# Patient Record
Sex: MALE | Race: WHITE | Employment: UNEMPLOYED | URBAN - METROPOLITAN AREA
[De-identification: names, ages, dates, MRNs, and addresses within clinical notes are randomized per-mention and may not be internally consistent; named-entity substitution may affect disease eponyms.]

---

## 2017-02-20 ENCOUNTER — HOSPITAL ENCOUNTER (OUTPATIENT)
Dept: GENERAL RADIOLOGY | Age: 47
Discharge: HOME | End: 2017-02-20
Payer: MEDICARE

## 2017-02-20 DIAGNOSIS — M25.572 LEFT ANKLE PAIN: ICD-10-CM

## 2017-02-20 PROCEDURE — 73630 X-RAY EXAM OF FOOT: CPT

## 2017-03-20 ENCOUNTER — HOSPITAL ENCOUNTER (OUTPATIENT)
Dept: GENERAL RADIOLOGY | Age: 47
Discharge: HOME | End: 2017-03-20
Attending: ORTHOPAEDIC SURGERY
Payer: MEDICARE

## 2017-03-20 DIAGNOSIS — M25.572 LEFT ANKLE PAIN: ICD-10-CM

## 2017-03-20 PROCEDURE — 73630 X-RAY EXAM OF FOOT: CPT

## 2017-05-12 ENCOUNTER — HOSPITAL ENCOUNTER (OUTPATIENT)
Dept: PREADMISSION TESTING | Age: 47
Discharge: HOME | End: 2017-05-12
Payer: MEDICARE

## 2017-05-12 VITALS — WEIGHT: 245 LBS | BODY MASS INDEX: 33.18 KG/M2 | HEIGHT: 72 IN

## 2017-05-12 LAB
ANION GAP BLD CALC-SCNC: 10 MMOL/L (ref 3–18)
ATRIAL RATE: 62 BPM
BACTERIA SPEC CULT: NORMAL
BUN SERPL-MCNC: 7 MG/DL (ref 7–18)
BUN/CREAT SERPL: 7 (ref 12–20)
CALCIUM SERPL-MCNC: 9 MG/DL (ref 8.5–10.1)
CALCULATED P AXIS, ECG09: 37 DEGREES
CALCULATED R AXIS, ECG10: 23 DEGREES
CALCULATED T AXIS, ECG11: 42 DEGREES
CHLORIDE SERPL-SCNC: 106 MMOL/L (ref 100–108)
CO2 SERPL-SCNC: 29 MMOL/L (ref 21–32)
CREAT SERPL-MCNC: 0.95 MG/DL (ref 0.6–1.3)
DIAGNOSIS, 93000: NORMAL
ERYTHROCYTE [DISTWIDTH] IN BLOOD BY AUTOMATED COUNT: 13.1 % (ref 11.6–14.5)
GLUCOSE SERPL-MCNC: 104 MG/DL (ref 74–99)
HCT VFR BLD AUTO: 40.9 % (ref 36–48)
HGB BLD-MCNC: 14.1 G/DL (ref 13–16)
MCH RBC QN AUTO: 31.3 PG (ref 24–34)
MCHC RBC AUTO-ENTMCNC: 34.5 G/DL (ref 31–37)
MCV RBC AUTO: 90.7 FL (ref 74–97)
P-R INTERVAL, ECG05: 162 MS
PLATELET # BLD AUTO: 173 K/UL (ref 135–420)
PMV BLD AUTO: 10.3 FL (ref 9.2–11.8)
POTASSIUM SERPL-SCNC: 4 MMOL/L (ref 3.5–5.5)
Q-T INTERVAL, ECG07: 426 MS
QRS DURATION, ECG06: 94 MS
QTC CALCULATION (BEZET), ECG08: 432 MS
RBC # BLD AUTO: 4.51 M/UL (ref 4.7–5.5)
SERVICE CMNT-IMP: NORMAL
SODIUM SERPL-SCNC: 145 MMOL/L (ref 136–145)
VENTRICULAR RATE, ECG03: 62 BPM
WBC # BLD AUTO: 4.6 K/UL (ref 4.6–13.2)

## 2017-05-12 PROCEDURE — 87641 MR-STAPH DNA AMP PROBE: CPT | Performed by: ORTHOPAEDIC SURGERY

## 2017-05-12 PROCEDURE — 85027 COMPLETE CBC AUTOMATED: CPT | Performed by: ORTHOPAEDIC SURGERY

## 2017-05-12 PROCEDURE — 80048 BASIC METABOLIC PNL TOTAL CA: CPT | Performed by: ORTHOPAEDIC SURGERY

## 2017-05-12 PROCEDURE — 36415 COLL VENOUS BLD VENIPUNCTURE: CPT | Performed by: ORTHOPAEDIC SURGERY

## 2017-05-12 PROCEDURE — 93005 ELECTROCARDIOGRAM TRACING: CPT

## 2017-05-12 RX ORDER — GABAPENTIN 100 MG/1
600 CAPSULE ORAL 3 TIMES DAILY
COMMUNITY
End: 2022-07-10 | Stop reason: SDUPTHER

## 2017-05-12 RX ORDER — CLONAZEPAM 1 MG/1
1 TABLET ORAL
COMMUNITY

## 2017-05-12 RX ORDER — TADALAFIL 5 MG/1
5 TABLET ORAL DAILY
COMMUNITY

## 2017-05-12 RX ORDER — CLONAZEPAM 2 MG/1
2 TABLET ORAL
COMMUNITY

## 2017-05-12 NOTE — PERIOP NOTES
Pt. Denies personal or family history of problems with anesthesia. Pt is independent. Denies sleep apnea. No CPAP. PCP notes in Care Everywhere. Spec Pop done and note placed in anesthesia documentation that pt drinks 42 alcoholic drinks per week.

## 2017-05-13 ENCOUNTER — ANESTHESIA EVENT (OUTPATIENT)
Dept: SURGERY | Age: 47
End: 2017-05-13
Payer: MEDICARE

## 2017-05-16 PROBLEM — M48.061 LUMBAR FORAMINAL STENOSIS: Status: ACTIVE | Noted: 2017-05-16

## 2017-05-16 PROBLEM — M51.36 DDD (DEGENERATIVE DISC DISEASE), LUMBAR: Status: ACTIVE | Noted: 2017-05-16

## 2017-05-16 PROBLEM — M51.26 HNP (HERNIATED NUCLEUS PULPOSUS), LUMBAR: Status: ACTIVE | Noted: 2017-05-16

## 2017-05-16 RX ORDER — LEVOTHYROXINE SODIUM 75 UG/1
75 TABLET ORAL
Status: CANCELLED | OUTPATIENT
Start: 2017-05-17

## 2017-05-16 RX ORDER — TAMSULOSIN HYDROCHLORIDE 0.4 MG/1
0.4 CAPSULE ORAL DAILY
Status: CANCELLED | OUTPATIENT
Start: 2017-05-17

## 2017-05-16 RX ORDER — CLONAZEPAM 0.5 MG/1
1 TABLET ORAL
Status: CANCELLED | OUTPATIENT
Start: 2017-05-16

## 2017-05-16 RX ORDER — CLONAZEPAM 0.5 MG/1
2 TABLET ORAL
Status: CANCELLED | OUTPATIENT
Start: 2017-05-16

## 2017-05-16 RX ORDER — PREGABALIN 75 MG/1
300 CAPSULE ORAL 2 TIMES DAILY
Status: CANCELLED | OUTPATIENT
Start: 2017-05-16

## 2017-05-16 RX ORDER — DEXTROAMPHETAMINE SACCHARATE, AMPHETAMINE ASPARTATE, DEXTROAMPHETAMINE SULFATE AND AMPHETAMINE SULFATE 7.5; 7.5; 7.5; 7.5 MG/1; MG/1; MG/1; MG/1
30 TABLET ORAL 2 TIMES DAILY
Status: CANCELLED | OUTPATIENT
Start: 2017-05-16

## 2017-05-16 RX ORDER — GABAPENTIN 100 MG/1
100 CAPSULE ORAL
Status: CANCELLED | OUTPATIENT
Start: 2017-05-16

## 2017-05-16 RX ORDER — ESOMEPRAZOLE MAGNESIUM 40 MG/1
40 CAPSULE, DELAYED RELEASE ORAL DAILY
Status: CANCELLED | OUTPATIENT
Start: 2017-05-17

## 2017-05-16 RX ORDER — GUANFACINE HYDROCHLORIDE 1 MG/1
5 TABLET ORAL
Status: CANCELLED | OUTPATIENT
Start: 2017-05-16

## 2017-05-22 ENCOUNTER — ANESTHESIA (OUTPATIENT)
Dept: SURGERY | Age: 47
End: 2017-05-22
Payer: MEDICARE

## 2017-05-22 ENCOUNTER — HOSPITAL ENCOUNTER (OUTPATIENT)
Age: 47
Setting detail: OUTPATIENT SURGERY
Discharge: HOME | End: 2017-05-22
Attending: ORTHOPAEDIC SURGERY | Admitting: ORTHOPAEDIC SURGERY
Payer: MEDICARE

## 2017-05-22 ENCOUNTER — APPOINTMENT (OUTPATIENT)
Dept: GENERAL RADIOLOGY | Age: 47
End: 2017-05-22
Attending: ORTHOPAEDIC SURGERY
Payer: MEDICARE

## 2017-05-22 VITALS
BODY MASS INDEX: 32.38 KG/M2 | HEART RATE: 64 BPM | TEMPERATURE: 97.5 F | HEIGHT: 72 IN | SYSTOLIC BLOOD PRESSURE: 126 MMHG | OXYGEN SATURATION: 100 % | WEIGHT: 239.06 LBS | DIASTOLIC BLOOD PRESSURE: 74 MMHG | RESPIRATION RATE: 18 BRPM

## 2017-05-22 PROBLEM — M48.061 LUMBAR FORAMINAL STENOSIS: Status: RESOLVED | Noted: 2017-05-16 | Resolved: 2017-05-22

## 2017-05-22 PROBLEM — M51.26 HNP (HERNIATED NUCLEUS PULPOSUS), LUMBAR: Status: RESOLVED | Noted: 2017-05-16 | Resolved: 2017-05-22

## 2017-05-22 PROCEDURE — 77030020782 HC GWN BAIR PAWS FLX 3M -B: Performed by: ORTHOPAEDIC SURGERY

## 2017-05-22 PROCEDURE — 74011250636 HC RX REV CODE- 250/636: Performed by: ORTHOPAEDIC SURGERY

## 2017-05-22 PROCEDURE — 76210000006 HC OR PH I REC 0.5 TO 1 HR: Performed by: ORTHOPAEDIC SURGERY

## 2017-05-22 PROCEDURE — 74011000250 HC RX REV CODE- 250: Performed by: ORTHOPAEDIC SURGERY

## 2017-05-22 PROCEDURE — 74011250636 HC RX REV CODE- 250/636: Performed by: ANESTHESIOLOGY

## 2017-05-22 PROCEDURE — 74011000258 HC RX REV CODE- 258: Performed by: ORTHOPAEDIC SURGERY

## 2017-05-22 PROCEDURE — 77030031139 HC SUT VCRL2 J&J -A: Performed by: ORTHOPAEDIC SURGERY

## 2017-05-22 PROCEDURE — 74011000250 HC RX REV CODE- 250

## 2017-05-22 PROCEDURE — 77030003666 HC NDL SPINAL BD -A: Performed by: ORTHOPAEDIC SURGERY

## 2017-05-22 PROCEDURE — 74011000272 HC RX REV CODE- 272: Performed by: ORTHOPAEDIC SURGERY

## 2017-05-22 PROCEDURE — 77030019908 HC STETH ESOPH SIMS -A: Performed by: ANESTHESIOLOGY

## 2017-05-22 PROCEDURE — 77030027521 HC SEAL BPLR AQMNTYS EVS MEDT -F: Performed by: ORTHOPAEDIC SURGERY

## 2017-05-22 PROCEDURE — 77030008462 HC STPLR SKN PROX J&J -A: Performed by: ORTHOPAEDIC SURGERY

## 2017-05-22 PROCEDURE — 77030018836 HC SOL IRR NACL ICUM -A: Performed by: ORTHOPAEDIC SURGERY

## 2017-05-22 PROCEDURE — 77030008477 HC STYL SATN SLP COVD -A: Performed by: ANESTHESIOLOGY

## 2017-05-22 PROCEDURE — 74011250637 HC RX REV CODE- 250/637: Performed by: ANESTHESIOLOGY

## 2017-05-22 PROCEDURE — 74011250636 HC RX REV CODE- 250/636

## 2017-05-22 PROCEDURE — 77030014650 HC SEAL MTRX FLOSEL BAXT -C: Performed by: ORTHOPAEDIC SURGERY

## 2017-05-22 PROCEDURE — 76010000153 HC OR TIME 1.5 TO 2 HR: Performed by: ORTHOPAEDIC SURGERY

## 2017-05-22 PROCEDURE — 77030013079 HC BLNKT BAIR HGGR 3M -A: Performed by: ANESTHESIOLOGY

## 2017-05-22 PROCEDURE — 77030003029 HC SUT VCRL J&J -B: Performed by: ORTHOPAEDIC SURGERY

## 2017-05-22 PROCEDURE — 76210000021 HC REC RM PH II 0.5 TO 1 HR: Performed by: ORTHOPAEDIC SURGERY

## 2017-05-22 PROCEDURE — 77030032490 HC SLV COMPR SCD KNE COVD -B: Performed by: ORTHOPAEDIC SURGERY

## 2017-05-22 PROCEDURE — 77030010507 HC ADH SKN DERMBND J&J -B: Performed by: ORTHOPAEDIC SURGERY

## 2017-05-22 PROCEDURE — 76060000034 HC ANESTHESIA 1.5 TO 2 HR: Performed by: ORTHOPAEDIC SURGERY

## 2017-05-22 PROCEDURE — 77030002986 HC SUT PROL J&J -A: Performed by: ORTHOPAEDIC SURGERY

## 2017-05-22 PROCEDURE — 77030011640 HC PAD GRND REM COVD -A: Performed by: ORTHOPAEDIC SURGERY

## 2017-05-22 PROCEDURE — 77030020262 HC SOL INJ SOD CL 0.9% 100ML: Performed by: ORTHOPAEDIC SURGERY

## 2017-05-22 PROCEDURE — 77030008683 HC TU ET CUF COVD -A: Performed by: ANESTHESIOLOGY

## 2017-05-22 PROCEDURE — 77030006643: Performed by: ANESTHESIOLOGY

## 2017-05-22 RX ORDER — FLUMAZENIL 0.1 MG/ML
0.2 INJECTION INTRAVENOUS
Status: DISCONTINUED | OUTPATIENT
Start: 2017-05-22 | End: 2017-05-22 | Stop reason: HOSPADM

## 2017-05-22 RX ORDER — NEOSTIGMINE METHYLSULFATE 5 MG/5 ML
SYRINGE (ML) INTRAVENOUS AS NEEDED
Status: DISCONTINUED | OUTPATIENT
Start: 2017-05-22 | End: 2017-05-22 | Stop reason: HOSPADM

## 2017-05-22 RX ORDER — KETOROLAC TROMETHAMINE 30 MG/ML
INJECTION, SOLUTION INTRAMUSCULAR; INTRAVENOUS AS NEEDED
Status: DISCONTINUED | OUTPATIENT
Start: 2017-05-22 | End: 2017-05-22 | Stop reason: HOSPADM

## 2017-05-22 RX ORDER — PROPOFOL 10 MG/ML
INJECTION, EMULSION INTRAVENOUS AS NEEDED
Status: DISCONTINUED | OUTPATIENT
Start: 2017-05-22 | End: 2017-05-22 | Stop reason: HOSPADM

## 2017-05-22 RX ORDER — SODIUM CHLORIDE, SODIUM LACTATE, POTASSIUM CHLORIDE, CALCIUM CHLORIDE 600; 310; 30; 20 MG/100ML; MG/100ML; MG/100ML; MG/100ML
125 INJECTION, SOLUTION INTRAVENOUS CONTINUOUS
Status: DISCONTINUED | OUTPATIENT
Start: 2017-05-22 | End: 2017-05-22 | Stop reason: HOSPADM

## 2017-05-22 RX ORDER — LIDOCAINE HYDROCHLORIDE 20 MG/ML
INJECTION, SOLUTION EPIDURAL; INFILTRATION; INTRACAUDAL; PERINEURAL AS NEEDED
Status: DISCONTINUED | OUTPATIENT
Start: 2017-05-22 | End: 2017-05-22 | Stop reason: HOSPADM

## 2017-05-22 RX ORDER — METOCLOPRAMIDE HYDROCHLORIDE 5 MG/ML
INJECTION INTRAMUSCULAR; INTRAVENOUS AS NEEDED
Status: DISCONTINUED | OUTPATIENT
Start: 2017-05-22 | End: 2017-05-22 | Stop reason: HOSPADM

## 2017-05-22 RX ORDER — ROCURONIUM BROMIDE 10 MG/ML
INJECTION, SOLUTION INTRAVENOUS AS NEEDED
Status: DISCONTINUED | OUTPATIENT
Start: 2017-05-22 | End: 2017-05-22 | Stop reason: HOSPADM

## 2017-05-22 RX ORDER — MIDAZOLAM HYDROCHLORIDE 1 MG/ML
INJECTION, SOLUTION INTRAMUSCULAR; INTRAVENOUS AS NEEDED
Status: DISCONTINUED | OUTPATIENT
Start: 2017-05-22 | End: 2017-05-22 | Stop reason: HOSPADM

## 2017-05-22 RX ORDER — ACETAMINOPHEN 10 MG/ML
INJECTION, SOLUTION INTRAVENOUS AS NEEDED
Status: DISCONTINUED | OUTPATIENT
Start: 2017-05-22 | End: 2017-05-22 | Stop reason: HOSPADM

## 2017-05-22 RX ORDER — FENTANYL CITRATE 50 UG/ML
INJECTION, SOLUTION INTRAMUSCULAR; INTRAVENOUS AS NEEDED
Status: DISCONTINUED | OUTPATIENT
Start: 2017-05-22 | End: 2017-05-22 | Stop reason: HOSPADM

## 2017-05-22 RX ORDER — GLYCOPYRROLATE 0.2 MG/ML
INJECTION INTRAMUSCULAR; INTRAVENOUS AS NEEDED
Status: DISCONTINUED | OUTPATIENT
Start: 2017-05-22 | End: 2017-05-22 | Stop reason: HOSPADM

## 2017-05-22 RX ORDER — SODIUM CHLORIDE 0.9 % (FLUSH) 0.9 %
5-10 SYRINGE (ML) INJECTION AS NEEDED
Status: DISCONTINUED | OUTPATIENT
Start: 2017-05-22 | End: 2017-05-22 | Stop reason: HOSPADM

## 2017-05-22 RX ORDER — INSULIN LISPRO 100 [IU]/ML
INJECTION, SOLUTION INTRAVENOUS; SUBCUTANEOUS ONCE
Status: DISCONTINUED | OUTPATIENT
Start: 2017-05-22 | End: 2017-05-22 | Stop reason: HOSPADM

## 2017-05-22 RX ORDER — DEXTROSE 50 % IN WATER (D50W) INTRAVENOUS SYRINGE
25-50 AS NEEDED
Status: DISCONTINUED | OUTPATIENT
Start: 2017-05-22 | End: 2017-05-22 | Stop reason: HOSPADM

## 2017-05-22 RX ORDER — TIZANIDINE 4 MG/1
4 TABLET ORAL
Qty: 60 TAB | Refills: 0 | Status: SHIPPED | OUTPATIENT
Start: 2017-05-22

## 2017-05-22 RX ORDER — OXYCODONE HYDROCHLORIDE 5 MG/1
5 TABLET ORAL ONCE
Status: COMPLETED | OUTPATIENT
Start: 2017-05-22 | End: 2017-05-22

## 2017-05-22 RX ORDER — MAGNESIUM SULFATE 100 %
4 CRYSTALS MISCELLANEOUS AS NEEDED
Status: DISCONTINUED | OUTPATIENT
Start: 2017-05-22 | End: 2017-05-22 | Stop reason: HOSPADM

## 2017-05-22 RX ORDER — FENTANYL CITRATE 50 UG/ML
50 INJECTION, SOLUTION INTRAMUSCULAR; INTRAVENOUS
Status: COMPLETED | OUTPATIENT
Start: 2017-05-22 | End: 2017-05-22

## 2017-05-22 RX ORDER — OXYCODONE HYDROCHLORIDE 10 MG/1
10-15 TABLET ORAL
Qty: 90 TAB | Refills: 0 | Status: ON HOLD | OUTPATIENT
Start: 2017-05-22 | End: 2017-06-15

## 2017-05-22 RX ORDER — NALOXONE HYDROCHLORIDE 0.4 MG/ML
0.1 INJECTION, SOLUTION INTRAMUSCULAR; INTRAVENOUS; SUBCUTANEOUS AS NEEDED
Status: DISCONTINUED | OUTPATIENT
Start: 2017-05-22 | End: 2017-05-22 | Stop reason: HOSPADM

## 2017-05-22 RX ORDER — SODIUM CHLORIDE, SODIUM LACTATE, POTASSIUM CHLORIDE, CALCIUM CHLORIDE 600; 310; 30; 20 MG/100ML; MG/100ML; MG/100ML; MG/100ML
1000 INJECTION, SOLUTION INTRAVENOUS CONTINUOUS
Status: DISCONTINUED | OUTPATIENT
Start: 2017-05-22 | End: 2017-05-22 | Stop reason: HOSPADM

## 2017-05-22 RX ORDER — ONDANSETRON 2 MG/ML
INJECTION INTRAMUSCULAR; INTRAVENOUS AS NEEDED
Status: DISCONTINUED | OUTPATIENT
Start: 2017-05-22 | End: 2017-05-22 | Stop reason: HOSPADM

## 2017-05-22 RX ADMIN — Medication 3 MG: at 09:39

## 2017-05-22 RX ADMIN — SODIUM CHLORIDE, SODIUM LACTATE, POTASSIUM CHLORIDE, AND CALCIUM CHLORIDE 125 ML/HR: 600; 310; 30; 20 INJECTION, SOLUTION INTRAVENOUS at 06:46

## 2017-05-22 RX ADMIN — KETOROLAC TROMETHAMINE 30 MG: 30 INJECTION, SOLUTION INTRAMUSCULAR; INTRAVENOUS at 09:31

## 2017-05-22 RX ADMIN — FENTANYL CITRATE 25 MCG: 50 INJECTION, SOLUTION INTRAMUSCULAR; INTRAVENOUS at 09:36

## 2017-05-22 RX ADMIN — KETOROLAC TROMETHAMINE 30 MG: 30 INJECTION, SOLUTION INTRAMUSCULAR; INTRAVENOUS at 09:34

## 2017-05-22 RX ADMIN — SODIUM CHLORIDE 900 MG: 900 INJECTION, SOLUTION INTRAVENOUS at 08:14

## 2017-05-22 RX ADMIN — FENTANYL CITRATE 50 MCG: 50 INJECTION, SOLUTION INTRAMUSCULAR; INTRAVENOUS at 07:21

## 2017-05-22 RX ADMIN — ONDANSETRON 4 MG: 2 INJECTION INTRAMUSCULAR; INTRAVENOUS at 08:14

## 2017-05-22 RX ADMIN — SODIUM CHLORIDE, SODIUM LACTATE, POTASSIUM CHLORIDE, AND CALCIUM CHLORIDE: 600; 310; 30; 20 INJECTION, SOLUTION INTRAVENOUS at 08:14

## 2017-05-22 RX ADMIN — ACETAMINOPHEN 1000 MG: 10 INJECTION, SOLUTION INTRAVENOUS at 09:27

## 2017-05-22 RX ADMIN — GLYCOPYRROLATE 0.4 MG: 0.2 INJECTION INTRAMUSCULAR; INTRAVENOUS at 09:39

## 2017-05-22 RX ADMIN — FENTANYL CITRATE 50 MCG: 50 INJECTION, SOLUTION INTRAMUSCULAR; INTRAVENOUS at 10:13

## 2017-05-22 RX ADMIN — FENTANYL CITRATE 50 MCG: 50 INJECTION, SOLUTION INTRAMUSCULAR; INTRAVENOUS at 10:18

## 2017-05-22 RX ADMIN — LIDOCAINE HYDROCHLORIDE 60 MG: 20 INJECTION, SOLUTION EPIDURAL; INFILTRATION; INTRACAUDAL; PERINEURAL at 08:20

## 2017-05-22 RX ADMIN — MIDAZOLAM HYDROCHLORIDE 3 MG: 1 INJECTION, SOLUTION INTRAMUSCULAR; INTRAVENOUS at 08:14

## 2017-05-22 RX ADMIN — FENTANYL CITRATE 25 MCG: 50 INJECTION, SOLUTION INTRAMUSCULAR; INTRAVENOUS at 09:33

## 2017-05-22 RX ADMIN — METOCLOPRAMIDE HYDROCHLORIDE 10 MG: 5 INJECTION INTRAMUSCULAR; INTRAVENOUS at 08:14

## 2017-05-22 RX ADMIN — SODIUM CHLORIDE, SODIUM LACTATE, POTASSIUM CHLORIDE, AND CALCIUM CHLORIDE 125 ML/HR: 600; 310; 30; 20 INJECTION, SOLUTION INTRAVENOUS at 10:03

## 2017-05-22 RX ADMIN — ROCURONIUM BROMIDE 60 MG: 10 INJECTION, SOLUTION INTRAVENOUS at 08:20

## 2017-05-22 RX ADMIN — FENTANYL CITRATE 100 MCG: 50 INJECTION, SOLUTION INTRAMUSCULAR; INTRAVENOUS at 08:20

## 2017-05-22 RX ADMIN — FENTANYL CITRATE 50 MCG: 50 INJECTION, SOLUTION INTRAMUSCULAR; INTRAVENOUS at 10:01

## 2017-05-22 RX ADMIN — MIDAZOLAM HYDROCHLORIDE 2 MG: 1 INJECTION, SOLUTION INTRAMUSCULAR; INTRAVENOUS at 08:17

## 2017-05-22 RX ADMIN — FENTANYL CITRATE 50 MCG: 50 INJECTION, SOLUTION INTRAMUSCULAR; INTRAVENOUS at 09:51

## 2017-05-22 RX ADMIN — FENTANYL CITRATE 50 MCG: 50 INJECTION, SOLUTION INTRAMUSCULAR; INTRAVENOUS at 10:06

## 2017-05-22 RX ADMIN — PROPOFOL 180 MG: 10 INJECTION, EMULSION INTRAVENOUS at 08:20

## 2017-05-22 RX ADMIN — OXYCODONE HYDROCHLORIDE 5 MG: 5 TABLET ORAL at 10:59

## 2017-05-22 NOTE — ANESTHESIA PREPROCEDURE EVALUATION
Anesthetic History   No history of anesthetic complications            Review of Systems / Medical History  Patient summary reviewed, nursing notes reviewed and pertinent labs reviewed    Pulmonary  Within defined limits            Pertinent negatives: No sleep apnea     Neuro/Psych     seizures: well controlled         Cardiovascular  Within defined limits  Hypertension: well controlled              Exercise tolerance: >4 METS     GI/Hepatic/Renal     GERD: well controlled           Endo/Other        Morbid obesity and arthritis  Pertinent negatives: No hypothyroidism   Other Findings              Physical Exam    Airway  Mallampati: II  TM Distance: 4 - 6 cm  Neck ROM: normal range of motion   Mouth opening: Normal     Cardiovascular    Rhythm: regular  Rate: normal         Dental    Dentition: Bridges  Comments: Left lower   Pulmonary  Breath sounds clear to auscultation               Abdominal  GI exam deferred       Other Findings            Anesthetic Plan    ASA: 3  Anesthesia type: general          Induction: Intravenous  Anesthetic plan and risks discussed with: Patient

## 2017-05-22 NOTE — PROGRESS NOTES
conducted a pre-surgery visit with Michael Velásquez, who is a 40 y.o.,male. The  provided the following Interventions:  Initiated a relationship of care and support. Offered prayer and assurance of continued prayers on patient's behalf. Plan:  Chaplains will continue to follow and will provide pastoral care on an as needed/requested basis.  recommends bedside caregivers page  on duty if patient shows signs of acute spiritual or emotional distress.     [address] [facility],[address], [address] [facility] office  [phone] pager

## 2017-05-22 NOTE — IP AVS SNAPSHOT
Aimee Foil 
 
 
 [address] [facility] [address] 
[phone] Patient: Maddison Miller MRN: [id number] CBE:[id number] You are allergic to the following Allergen Reactions Prozac (Fluoxetine) Unknown (comments) Hives or jittery-pt can't remember Augmentin (Amoxicillin-Pot Clavulanate) Rash Geodon (Ziprasidone Hcl) Other (comments) diskensia reported by pt Mastisol Adhesive (Gum Kwoudg-Vnqlxk-Ubdm-Alcohol) Rash Morphine Itching Morphine -IR, not ER Quetiapine Palpitations Trazodone Hives Vicodin (Hydrocodone-Acetaminophen) Hives Recent Documentation Height Weight BMI Smoking Status 1.829 m 108.4 kg 32.42 kg/m2 Former Smoker Emergency Contacts Name Discharge Info Relation Home Work Mobile Birgit An CAREGIVER [3] Friend [5] [phone] About your hospitalization You were admitted on:  May 22, 2017 [facility] You were discharged on:  May 22, 2017 Unit phone number:  [phone] Why you were hospitalized Your primary diagnosis was:  Lumbar Foraminal Stenosis Your diagnoses also included:  Ddd (Degenerative Disc Disease), Lumbar, Hnp (Herniated Nucleus Pulposus), Lumbar Providers Seen During Your Hospitalizations Provider Role Specialty Primary office phone Sinai Stanford MD Attending Provider Orthopedic Surgery [phone] Your Primary Care Physician (PCP) Primary Care Physician Office Phone Office Fax Zunilda Conde [phone] Follow-up Information Follow up With Details Comments Contact Info Shirlene Olson MD   [facility] [address] [facility] [address] [place] [address] [phone] Sinai Stanford MD Follow up in 10 day(s)  [address] [facility] and [address] [place] [address] [address] [address] 
[phone] Current Discharge Medication List  
  
START taking these medications Dose & Instructions Dispensing Information Comments Morning Noon Evening Bedtime  
 tiZANidine 4 mg tablet Commonly known as:  Elena Valencia Your last dose was: Your next dose is:    
   
   
 Dose:  4 mg Take 1 Tab by mouth three (3) times daily as needed. Indications: MUSCLE SPASM Quantity:  60 Tab Refills:  0 CONTINUE these medications which have CHANGED Dose & Instructions Dispensing Information Comments Morning Noon Evening Bedtime  
 oxyCODONE IR 10 mg Tab immediate release tablet Commonly known as:  Marii Ceballos What changed:  how much to take Your last dose was: Your next dose is:    
   
   
 Dose:  10-15 mg Take 1-1.5 Tabs by mouth every four (4) hours as needed. Max Daily Amount: 90 mg. Quantity:  90 Tab Refills:  0 CONTINUE these medications which have NOT CHANGED Dose & Instructions Dispensing Information Comments Morning Noon Evening Bedtime CIALIS 5 mg tablet Generic drug:  tadalafil Your last dose was: Your next dose is:    
   
   
 Dose:  5 mg Take 5 mg by mouth daily. Indications: Erectile Dysfunction Refills:  0  
     
   
   
   
  
 dextroamphetamine-amphetamine 30 mg tablet Commonly known as:  ADDERALL Your last dose was: Your next dose is:    
   
   
 Dose:  30 mg Take 30 mg by mouth two (2) times a day. Indications: ATTENTION-DEFICIT HYPERACTIVITY DISORDER Refills:  0  
     
   
   
   
  
 esomeprazole 40 mg capsule Commonly known as:  Kely Raid Your last dose was: Your next dose is:    
   
   
 Dose:  40 mg Take 40 mg by mouth daily. Indications: GASTROESOPHAGEAL REFLUX Refills:  0  
     
   
   
   
  
 gabapentin 100 mg capsule Commonly known as:  NEURONTIN  
   
 Your last dose was: Your next dose is:    
   
   
 Dose:  100 mg Take 100 mg by mouth four (4) times daily as needed. Indications: NEUROPATHIC PAIN Refills:  0  
     
   
   
   
  
 * KlonoPIN 2 mg tablet Generic drug:  clonazePAM  
   
Your last dose was: Your next dose is:    
   
   
 Dose:  2 mg Take 2 mg by mouth nightly. Indications: sleep Refills:  0  
     
   
   
   
  
 * KlonoPIN 1 mg tablet Generic drug:  clonazePAM  
   
Your last dose was: Your next dose is:    
   
   
 Dose:  1 mg Take 1 mg by mouth three (3) times daily as needed. Indications: anxiety Refills:  0  
     
   
   
   
  
 levothyroxine 75 mcg tablet Commonly known as:  SYNTHROID Your last dose was: Your next dose is:    
   
   
 Dose:  75 mcg Take 75 mcg by mouth Daily (before breakfast). Indications: hypothyroidism Refills:  0  
     
   
   
   
  
 pregabalin 300 mg capsule Commonly known as:  Benson Susu Your last dose was: Your next dose is:    
   
   
 Dose:  300 mg Take 300 mg by mouth two (2) times a day. Indications: GENERALIZED ANXIETY DISORDER, neuropathy Refills:  0  
     
   
   
   
  
 tamsulosin 0.4 mg capsule Commonly known as:  FLOMAX Your last dose was: Your next dose is:    
   
   
 Dose:  0.4 mg Take 0.4 mg by mouth daily. Indications: neurogenic bladder Refills:  0  
     
   
   
   
  
 TENEX 2 mg IR tablet Generic drug:  guanFACINE IR Your last dose was: Your next dose is:    
   
   
 Dose:  5 mg Take 5 mg by mouth nightly as needed. Indications: ptsd Refills:  0  
     
   
   
   
  
 vortioxetine 20 mg tablet Commonly known as:  TRINTELLIX Your last dose was: Your next dose is:    
   
   
 Dose:  20 mg Take 20 mg by mouth daily. Indications: major depressive disorder, ptsd Refills:  0 * Notice: This list has 2 medication(s) that are the same as other medications prescribed for you. Read the directions carefully, and ask your doctor or other care provider to review them with you. Where to Get Your Medications Information on where to get these meds will be given to you by the nurse or doctor. ! Ask your nurse or doctor about these medications  
  oxyCODONE IR 10 mg Tab immediate release tablet  
 tiZANidine 4 mg tablet Discharge Instructions WBAT. Change dressing in 3 days. Do not get incision wet x 5 days. No lifting over 20 pounds. Wear lumbar brace when out of bed, until follow up appointment. Take stool softeners daily while taking pain medications, since pain medicines are constipating. DISCHARGE SUMMARY from Nurse The following personal items are in your possession at time of discharge: 
 
Dental Appliances: Other (comment) (Dental bridge) Visual Aid: Glasses, At home Home Medications: None Jewelry: None Clothing: Pants, Undergarments, Footwear, Shirt Yariel Board) Other Valuables: Cell Phone, Walk[employer] [address]) PATIENT INSTRUCTIONS: 
 
 
F-face looks uneven A-arms unable to move or move unevenly S-speech slurred or non-existent T-time-call 911 as soon as signs and symptoms begin-DO NOT go Back to bed or wait to see if you get better-TIME IS BRAIN. Warning Signs of HEART ATTACK Call 911 if you have these symptoms: 
? Chest discomfort.  Most heart attacks involve discomfort in the center of the chest that lasts more than a few minutes, or that goes away and comes back. It can feel like uncomfortable pressure, squeezing, fullness, or pain. ? Discomfort in other areas of the upper body. Symptoms can include pain or discomfort in one or both arms, the back, neck, jaw, or stomach. ? Shortness of breath with or without chest discomfort. ? Other signs may include breaking out in a cold sweat, nausea, or lightheadedness. Don't wait more than five minutes to call [address]! Fast action can save your life. Calling 911 is almost always the fastest way to get lifesaving treatment. Emergency Medical Services staff can begin treatment when they arrive  up to an hour sooner than if someone gets to the hospital by car. Patient armband removed and shredded The discharge information has been reviewed with the patient and caregiver. The patient and caregiver verbalized understanding. Discharge medications reviewed with the patient and caregiver and appropriate educational materials and side effects teaching were provided. Discharge Orders None Introducing \[facility] [place] [facility]\"" & [facility]! [facility] introduces madKast patient portal. Now you can access parts of your medical record, email your doctor's office, and request medication refills online. 1. In your internet browser, go to [email or web address] 2. Click on the First Time User? Click Here link in the Sign In box. You will see the New Member Sign Up page. 3. Enter your madKast Access Code exactly as it appears below. You will not need to use this code after youve completed the sign-up process. If you do not sign up before the expiration date, you must request a new code. · madKast Access Code: [email or web address] Expires: 8/6/2017  6:27 PM 
 
4. Enter the last four digits of your Social Security Number (xxxx) and Date of Birth (mm/dd/yyyy) as indicated and click Submit.  You will be taken to the next sign-up page. 5. Create a Herziot ID. This will be your JustFamily login ID and cannot be changed, so think of one that is secure and easy to remember. 6. Create a JustFamily password. You can change your password at any time. 7. Enter your Password Reset Question and Answer. This can be used at a later time if you forget your password. 8. Enter your e-mail address. You will receive e-mail notification when new information is available in [address] 9. Click Sign Up. You can now view and download portions of your medical record. 10. Click the Download Summary menu link to download a portable copy of your medical information. If you have questions, please visit the Frequently Asked Questions section of the JustFamily website. Remember, JustFamily is NOT to be used for urgent needs. For medical emergencies, dial 911. Now available from your iPhone and Android! General Information Please provide this summary of care documentation to your next provider. Patient Signature:  ____________________________________________________________ Date:  ____________________________________________________________  
  
Catrachita Lemons Provider Signature:  ____________________________________________________________ Date:  ____________________________________________________________

## 2017-05-22 NOTE — OP NOTES
Patient: Hayley Currie MRN: [id number]  SSN: [id number]    YOB: 1970  Age: 40 y.o. Sex: male      Date of Procedure: 5/22/2017   Preoperative Diagnosis: LUMBAR STENOSIS,LUMBAGO,FACET ARTHROSIS,LEFT SCIATICA,REFLUX,HYPOTHYROIDISM,TRAUMATIC BRAIN INJURY,POST TRAUMATIC STRESS DISORDER  Postoperative Diagnosis: LUMBAR STENOSIS,LUMBAGO,FACET ARTHROSIS,LEFT SCIATICA,REFLUX,HYPOTHYROIDISM,TRAUMATIC BRAIN INJURY,POST TRAUMATIC STRESS DISORDER    Procedure: Procedure(s):  LEFT L3-4,4-5 LAMINECTOMIES W/C-ARM **SPEC POP**  Surgeon(s) and Role:     * Magalys Wasserman MD - Primary  Assistant: Jasmina Chavis PA-C  Anesthesia: General   Estimated Blood Loss: 50cc  Specimens: * No specimens in log *   Findings: HNP   Complications: none      Indications: This is a 40y.o. year-old male who presents with significant left lower extremity pain, worsening ability to do activities of daily living. X-rays and MRI scan revealing disc herniation and foraminal stenosis, and degenerative disk disease. Having having failed conservative care now comes for operative intervention. DESCRIPTION OF PROCEDURE: After correct identification, the patient was   taken to the operating room, placed supine on the table, general   endotracheal anesthesia induced. The patient was then rotated onto the Pedro frame and prepped with DuraPrep. 2grams of Ancef was given. Midline incision was made in the lumbar spine, taken down to the spinous processes of L3-5. The posterior lamina of L3-5 were exposed on the left. Keyana retractor was then placed. The wound was then irrigated. Laminectomy was then performed of the inferior aspect of L4 as well as the superior aspect of L5. This provided excellent visualization of the dura. Foraminotomy was then performed to completely decompress the nerve root. The nerve root was then mobilized medially and held with a nerve root retractor. The disc was noted to be protruberant.  #15 blade was used to make a cruciate incision in the annulus and large pieces of disc material were removed from behind the annulus as well as from the disc space itself. Bleeding controlled with bipolar electrocautery. Laminectomy was then performed of the inferior aspect of L3 as well as the superior aspect of L4. This provided excellent visualization of the dura. Foraminotomy was then performed to completely decompress the nerve root. The nerve root was then mobilized medially and held with a nerve root retractor. The disc was noted to have extruded material. #15 blade was used to make a cruciate incision in the annulus and large pieces of disc material were removed from behind the annulus as well as from the disc space itself. Bleeding controlled with bipolar electrocautery. The wound was then irrigated. Fascia was then closed using #1 Vicryl suture. Subcutaneous tissue   approximated with 2-0 Vicryl suture. Skin approximated with 3-0 PDS suture and dermabond was appled. Sterile dressing was applied. The patient tolerated the procedure well and taken to Recovery room in good   condition.

## 2017-05-22 NOTE — PERIOP NOTES
Patient arrived to PACU at 7757  Report received from RICKEY Conley and [facility] regarding patient . Surgeon(s):Mylene and Procedure(s): verified   Confirmed with allergies and dressings discussed. Anesthesia type, drugs, patient history, complications, estimated blood loss, vital signs, intake and output, and last pain medication, lines, reversal medications and temperature were reviewed. PACU monitoring initiated. See doc flow sheet for detailed physical assessment.

## 2017-05-22 NOTE — INTERVAL H&P NOTE
H&P Update:  Maddison Miller was seen and examined. History and physical has been reviewed. The patient has been examined.  There have been no significant clinical changes since the completion of the originally dated History and Physical.    Signed By: Sinai Stanford MD     May 22, 2017 7:31 AM

## 2017-05-22 NOTE — DISCHARGE INSTRUCTIONS
WBAT. Change dressing in 3 days. Do not get incision wet x 5 days. No lifting over 20 pounds. Wear lumbar brace when out of bed, until follow up appointment. Take stool softeners daily while taking pain medications, since pain medicines are constipating. DISCHARGE SUMMARY from Nurse    The following personal items are in your possession at time of discharge:    Dental Appliances: Other (comment) (Dental bridge)  Visual Aid: Glasses, At home     Home Medications: None  Jewelry: None  Clothing: Pants, Undergarments, Footwear, Shirt Keron Olea)  Other Valuables: Cell Phone, Wallet (Elmer)             PATIENT INSTRUCTIONS:    After general anesthesia or intravenous sedation, for 24 hours or while taking prescription Narcotics:  · Limit your activities  · Do not drive and operate hazardous machinery  · Do not make important personal or business decisions  · Do  not drink alcoholic beverages  · If you have not urinated within 8 hours after discharge, please contact your surgeon on call. Report the following to your surgeon:  · Excessive pain, swelling, redness or odor of or around the surgical area  · Temperature over 100.5  · Nausea and vomiting lasting longer than 4 hours or if unable to take medications  · Any signs of decreased circulation or nerve impairment to extremity: change in color, persistent  numbness, tingling, coldness or increase pain  · Any questions        What to do at Home:  Recommended activity: Ambulate in house, No lifting, or Strenuous exercise for until advised and No driving while on analgesics,     If you experience any of the following symptoms fever, chills, uncontrollable pauin , active bleeding or drainage, circulation changes, please follow up with Dr. Dimitris Haddad. *  Please give a list of your current medications to your Primary Care Provider.     *  Please update this list whenever your medications are discontinued, doses are      changed, or new medications (including over-the-counter products) are added. *  Please carry medication information at all times in case of emergency situations. These are general instructions for a healthy lifestyle:    No smoking/ No tobacco products/ Avoid exposure to second hand smoke    Surgeon General's Warning:  Quitting smoking now greatly reduces serious risk to your health. Obesity, smoking, and sedentary lifestyle greatly increases your risk for illness    A healthy diet, regular physical exercise & weight monitoring are important for maintaining a healthy lifestyle    You may be retaining fluid if you have a history of heart failure or if you experience any of the following symptoms:  Weight gain of 3 pounds or more overnight or 5 pounds in a week, increased swelling in our hands or feet or shortness of breath while lying flat in bed. Please call your doctor as soon as you notice any of these symptoms; do not wait until your next office visit. Recognize signs and symptoms of STROKE:    F-face looks uneven    A-arms unable to move or move unevenly    S-speech slurred or non-existent    T-time-call 911 as soon as signs and symptoms begin-DO NOT go       Back to bed or wait to see if you get better-TIME IS BRAIN. Warning Signs of HEART ATTACK     Call 911 if you have these symptoms:   Chest discomfort. Most heart attacks involve discomfort in the center of the chest that lasts more than a few minutes, or that goes away and comes back. It can feel like uncomfortable pressure, squeezing, fullness, or pain.  Discomfort in other areas of the upper body. Symptoms can include pain or discomfort in one or both arms, the back, neck, jaw, or stomach.  Shortness of breath with or without chest discomfort.  Other signs may include breaking out in a cold sweat, nausea, or lightheadedness. Don't wait more than five minutes to call 911 - MINUTES MATTER! Fast action can save your life.  Calling 911 is almost always the fastest way to get lifesaving treatment. Emergency Medical Services staff can begin treatment when they arrive -- up to an hour sooner than if someone gets to the hospital by car. Patient armband removed and shredded  The discharge information has been reviewed with the patient and caregiver. The patient and caregiver verbalized understanding. Discharge medications reviewed with the patient and caregiver and appropriate educational materials and side effects teaching were provided.

## 2017-05-22 NOTE — PERIOP NOTES
TRANSFER - OUT REPORT:    Verbal report given to [facility] RN(name) on Yolanda Oliveira  being transferred to [address] [place], [place] [address]unit) for routine post - op       Report consisted of patients Situation, Background, Assessment and   Recommendations(SBAR). Information from the following report(s) OR Summary, Intake/Output and MAR was reviewed with the receiving nurse. Lines:   Peripheral IV 05/22/17 Right Hand (Active)   Site Assessment Clean, dry, & intact 5/22/2017 10:29 AM   Phlebitis Assessment 0 5/22/2017 10:29 AM   Infiltration Assessment 0 5/22/2017 10:29 AM   Dressing Status Clean, dry, & intact 5/22/2017 10:29 AM   Dressing Type Transparent;Tape 5/22/2017 10:29 AM   Hub Color/Line Status Infusing 5/22/2017 10:29 AM        Opportunity for questions and clarification was provided.       Patient transported with:   [employer]

## 2017-05-22 NOTE — PERIOP NOTES
AVS med list reviewed and verified - duplicate for Klonopin noted - prescribed by MD - common med side effects handout to pt

## 2017-05-22 NOTE — BRIEF OP NOTE
BRIEF OPERATIVE NOTE    Date of Procedure: 5/22/2017   Preoperative Diagnosis: LUMBAR STENOSIS,LUMBAGO,FACET ARTHROSIS,LEFT SCIATICA,REFLUX,HYPOTHYROIDISM,TRAUMATIC BRAIN INJURY,POST TRAUMATIC STRESS DISORDER  Postoperative Diagnosis: LUMBAR STENOSIS,LUMBAGO,FACET ARTHROSIS,LEFT SCIATICA,REFLUX,HYPOTHYROIDISM,TRAUMATIC BRAIN INJURY,POST TRAUMATIC STRESS DISORDER    Procedure: Procedure(s):  LEFT L3-4,4-5 LAMINECTOMIES W/C-ARM **SPEC POP**  Surgeon(s) and Role:     * Franck Chahal MD - Primary  Assistant: Felisa Islas PA-C  Anesthesia: General   Estimated Blood Loss: 10cc  Specimens: * No specimens in log *   Findings: left L3-4, L4-5 spinal stenosis,HNP,facet arthrosis.   Complications: none  Implants: * No implants in log *

## 2017-05-22 NOTE — ANESTHESIA POSTPROCEDURE EVALUATION
Post-Anesthesia Evaluation & Assessment    Visit Vitals    /74    Pulse 66    Temp 36.1 °C (97 °F)    Resp 15    Ht 6' (1.829 m)    Wt 108.4 kg (239 lb 1 oz)    SpO2 100%    BMI 32.42 kg/m2       No untreated/active PONV    Post-operative hydration adequate. Adequate post-operative analgesia per PACU discharge criteria    Mental status & level of consciousness: alert and oriented x 3    Respiratory status: patent unassisted airway     No apparent anesthetic complications requiring additional post-anesthetic care    Patient has met all discharge requirements.             Elizabeth Tapia MD

## 2017-06-13 ENCOUNTER — ANESTHESIA EVENT (OUTPATIENT)
Dept: SURGERY | Age: 47
DRG: 858 | End: 2017-06-13
Payer: MEDICARE

## 2017-06-13 PROBLEM — T81.89XA LUMBAR SURGICAL WOUND FLUID COLLECTION: Status: ACTIVE | Noted: 2017-06-13

## 2017-06-13 PROBLEM — A49.02 MRSA (METHICILLIN RESISTANT STAPHYLOCOCCUS AUREUS) INFECTION: Status: ACTIVE | Noted: 2017-06-13

## 2017-06-13 NOTE — H&P
Patient Name:  Juliet Meyer  YOB: 1970      Chief Complaint:  Status post L3 to L5 laminectomies done 05/22/17. History of Chief Complaint:  Mr. Mili Francisco is a 40 y.o male  s/p L3 to L5 laminectomies done 05/22/17 by Dr Jerod Alexis at [facility]. He says he has been overall feeling a bit worse. He has been taken his antibiotics. He has noticed some drainage coming from his wound. He has been putting peroxide on it and antibiotic cream.  Lumbar wound cultures results  taken in the office on 6/8/17 are on the paper chart & reveal MRSA.     Past Medical/Surgical History:    Disease/Disorder Type Date Side Surgery Date Side Comment   Arthritis          Osteoarthritis          Depression          Headache, migraine          PTSD          Traumatic brain injury       DL 03/23/2017 -frontal lobe   Nerve disorder          Spinal stenosis          Thyroid disease          Seizure disorder              Ankle surgery  left    IBS - Irritable bowel syndrome          Mental illness              ACL repair 1988 left        Spinal fusion, cervical 2009  DL 03/23/2017 -C5-6       Spinal fusion, thoracic 2011  DL 03/23/2017 -T10-12       Gastric sleeve 2013         Spinal fusion, cervical 2016  DL 03/23/2017 -C6-7     Allergies:    Ingredient Reaction Medication Name Comment   ZIPRASIDONE MESYLATE Tardive dyskinesia Geodon    ZIPRASIDONE HCL Tardive dyskinesia Geodon    AMOXICILLIN TRIHYDRATE Hives Augmentin    ACETAMINOPHEN Hives Vicodin    HYDROCODONE BITARTRATE Hives Vicodin    QUETIAPINE FUMARATE  Seroquel    MORPHINE      TAPE, OCCLUSIVE ADHESIVE      POTASSIUM CLAVULANATE      TRAZODONE        Current Medications:    Medication Directions   dicyclomine 10 mg capsule    Brintellix 20 mg tablet    Xanax 0.5 mg tablet    Mobic 7.5 mg tablet    Lyrica 300 mg capsule    clonazepam 2 mg tablet    Cialis 5 mg tablet take 1 tablet by oral route  every day   Synthroid 75 mcg tablet    Prilosec 20 mg capsule,delayed release take 1 capsule by oral route  every day before a meal   Flomax 0.4 mg capsule take 1 capsule by oral route  every day   gabapentin 100 mg capsule take 1 (100MG)  by oral route 3 times every day   Tenex take 5mg tablet by oral route  every day at bedtime   Percocet 5 mg-325 mg tablet take 1 tablet by oral route  every 6 hours as needed   Cipro 500 mg tablet take 1 tablet by oral route  every 12 hours     Social History:      SMOKING  Status Tobacco Type Units Per Day Yrs Used   Former smoker Cigarette       ALCOHOL  There is no current alcohol usage   Type: Beer and liquor. 6 pk of beer consumed weekly. Patient quit drinking in 7. Family History:    Disease Detail Family Member Age Cause of Death Comments   Family history of Arthritis   N    Family history of Cardiovascular disease   N    Family history of Diabetes mellitus   N    Family history of Hypertension   N    Family history of Stroke   N    Family history of Tuberculosis   N    Family history of Alcoholism       Family history of Cancer       Family history of Heart disease         Review of Systems:    Pertinent positives include chills and joint pain. Pertinent negatives include muscle weakness. Vitals:  Date BP Pulse Temp (F) Resp. (per min.) Height (Total in.) Weight (lbs.) BMI   01/07/2016     72.00  34.58     Physical Examination:    Heart- RRR  Lungs-CTA sadaf  Abdomen- +BS,soft,nontender  Skin:  His wound seems to be closing, but he has three areas which seem to be slow to heal. There is no erythema. Musculoskeletal:  There is no tenderness in the lower back. Impression: We will proceed with return to the operating room for irrigation and debridement. Wound cultures taken in the office on 6/8/17 are on the paper chart & reveal MRSA.

## 2017-06-14 ENCOUNTER — HOSPITAL ENCOUNTER (INPATIENT)
Age: 47
LOS: 1 days | Discharge: HOME HEALTH | DRG: 858 | End: 2017-06-15
Attending: ORTHOPAEDIC SURGERY | Admitting: ORTHOPAEDIC SURGERY
Payer: MEDICARE

## 2017-06-14 ENCOUNTER — ANESTHESIA (OUTPATIENT)
Dept: SURGERY | Age: 47
DRG: 858 | End: 2017-06-14
Payer: MEDICARE

## 2017-06-14 LAB
CRP SERPL-MCNC: 4 MG/DL (ref 0–0.3)
ERYTHROCYTE [DISTWIDTH] IN BLOOD BY AUTOMATED COUNT: 12.9 % (ref 11.6–14.5)
ERYTHROCYTE [SEDIMENTATION RATE] IN BLOOD: 32 MM/HR (ref 0–15)
HCT VFR BLD AUTO: 37.5 % (ref 36–48)
HGB BLD-MCNC: 12.8 G/DL (ref 13–16)
MCH RBC QN AUTO: 31.1 PG (ref 24–34)
MCHC RBC AUTO-ENTMCNC: 34.1 G/DL (ref 31–37)
MCV RBC AUTO: 91 FL (ref 74–97)
PLATELET # BLD AUTO: 324 K/UL (ref 135–420)
PMV BLD AUTO: 9.7 FL (ref 9.2–11.8)
RBC # BLD AUTO: 4.12 M/UL (ref 4.7–5.5)
WBC # BLD AUTO: 6.8 K/UL (ref 4.6–13.2)

## 2017-06-14 PROCEDURE — 76210000000 HC OR PH I REC 2 TO 2.5 HR: Performed by: ORTHOPAEDIC SURGERY

## 2017-06-14 PROCEDURE — 51798 US URINE CAPACITY MEASURE: CPT

## 2017-06-14 PROCEDURE — 74011250637 HC RX REV CODE- 250/637: Performed by: PHYSICIAN ASSISTANT

## 2017-06-14 PROCEDURE — 74011250636 HC RX REV CODE- 250/636: Performed by: ANESTHESIOLOGY

## 2017-06-14 PROCEDURE — 74011250636 HC RX REV CODE- 250/636: Performed by: ORTHOPAEDIC SURGERY

## 2017-06-14 PROCEDURE — 74011250636 HC RX REV CODE- 250/636

## 2017-06-14 PROCEDURE — 77030002916 HC SUT ETHLN J&J -A: Performed by: ORTHOPAEDIC SURGERY

## 2017-06-14 PROCEDURE — 36415 COLL VENOUS BLD VENIPUNCTURE: CPT | Performed by: ORTHOPAEDIC SURGERY

## 2017-06-14 PROCEDURE — 77030008683 HC TU ET CUF COVD -A: Performed by: ANESTHESIOLOGY

## 2017-06-14 PROCEDURE — 77030018836 HC SOL IRR NACL ICUM -A: Performed by: ORTHOPAEDIC SURGERY

## 2017-06-14 PROCEDURE — 77030006643: Performed by: ANESTHESIOLOGY

## 2017-06-14 PROCEDURE — 77030011943: Performed by: ORTHOPAEDIC SURGERY

## 2017-06-14 PROCEDURE — 74011000250 HC RX REV CODE- 250

## 2017-06-14 PROCEDURE — 77030002966 HC SUT PDS J&J -A: Performed by: ORTHOPAEDIC SURGERY

## 2017-06-14 PROCEDURE — 77030027355 HC HNDPC IRR SURGLAV STRY -B: Performed by: ORTHOPAEDIC SURGERY

## 2017-06-14 PROCEDURE — 77030011640 HC PAD GRND REM COVD -A: Performed by: ORTHOPAEDIC SURGERY

## 2017-06-14 PROCEDURE — 77030012406 HC DRN WND PENRS BARD -A: Performed by: ORTHOPAEDIC SURGERY

## 2017-06-14 PROCEDURE — 85027 COMPLETE CBC AUTOMATED: CPT | Performed by: ORTHOPAEDIC SURGERY

## 2017-06-14 PROCEDURE — 65270000029 HC RM PRIVATE

## 2017-06-14 PROCEDURE — 76060000035 HC ANESTHESIA 2 TO 2.5 HR: Performed by: ORTHOPAEDIC SURGERY

## 2017-06-14 PROCEDURE — 74011250637 HC RX REV CODE- 250/637: Performed by: ORTHOPAEDIC SURGERY

## 2017-06-14 PROCEDURE — 86140 C-REACTIVE PROTEIN: CPT | Performed by: ORTHOPAEDIC SURGERY

## 2017-06-14 PROCEDURE — 77030011943

## 2017-06-14 PROCEDURE — 77010033678 HC OXYGEN DAILY

## 2017-06-14 PROCEDURE — 77030018835 HC SOL IRR LR ICUM -A: Performed by: ORTHOPAEDIC SURGERY

## 2017-06-14 PROCEDURE — 74011250636 HC RX REV CODE- 250/636: Performed by: PHYSICIAN ASSISTANT

## 2017-06-14 PROCEDURE — 76010000131 HC OR TIME 2 TO 2.5 HR: Performed by: ORTHOPAEDIC SURGERY

## 2017-06-14 PROCEDURE — 77030011256 HC DRSG MEPILEX <16IN NO BORD MOLN -A

## 2017-06-14 PROCEDURE — 0JD70ZZ EXTRACTION OF BACK SUBCUTANEOUS TISSUE AND FASCIA, OPEN APPROACH: ICD-10-PCS | Performed by: ORTHOPAEDIC SURGERY

## 2017-06-14 PROCEDURE — 77030013079 HC BLNKT BAIR HGGR 3M -A: Performed by: ANESTHESIOLOGY

## 2017-06-14 PROCEDURE — 77030008462 HC STPLR SKN PROX J&J -A: Performed by: ORTHOPAEDIC SURGERY

## 2017-06-14 PROCEDURE — 77030020782 HC GWN BAIR PAWS FLX 3M -B: Performed by: ORTHOPAEDIC SURGERY

## 2017-06-14 PROCEDURE — 77030008477 HC STYL SATN SLP COVD -A: Performed by: ANESTHESIOLOGY

## 2017-06-14 PROCEDURE — 85652 RBC SED RATE AUTOMATED: CPT | Performed by: ORTHOPAEDIC SURGERY

## 2017-06-14 PROCEDURE — 77030002967 HC SUT PDS J&J -B: Performed by: ORTHOPAEDIC SURGERY

## 2017-06-14 PROCEDURE — 74011000250 HC RX REV CODE- 250: Performed by: ORTHOPAEDIC SURGERY

## 2017-06-14 RX ORDER — HYDROMORPHONE HYDROCHLORIDE 1 MG/ML
0.5 INJECTION, SOLUTION INTRAMUSCULAR; INTRAVENOUS; SUBCUTANEOUS
Status: COMPLETED | OUTPATIENT
Start: 2017-06-14 | End: 2017-06-14

## 2017-06-14 RX ORDER — GABAPENTIN 100 MG/1
100 CAPSULE ORAL
Status: DISCONTINUED | OUTPATIENT
Start: 2017-06-14 | End: 2017-06-15 | Stop reason: HOSPADM

## 2017-06-14 RX ORDER — TAMSULOSIN HYDROCHLORIDE 0.4 MG/1
0.4 CAPSULE ORAL DAILY
Status: DISCONTINUED | OUTPATIENT
Start: 2017-06-15 | End: 2017-06-15 | Stop reason: HOSPADM

## 2017-06-14 RX ORDER — MIDAZOLAM HYDROCHLORIDE 1 MG/ML
INJECTION, SOLUTION INTRAMUSCULAR; INTRAVENOUS AS NEEDED
Status: DISCONTINUED | OUTPATIENT
Start: 2017-06-14 | End: 2017-06-14 | Stop reason: HOSPADM

## 2017-06-14 RX ORDER — NALOXONE HYDROCHLORIDE 0.4 MG/ML
0.1 INJECTION, SOLUTION INTRAMUSCULAR; INTRAVENOUS; SUBCUTANEOUS AS NEEDED
Status: DISCONTINUED | OUTPATIENT
Start: 2017-06-14 | End: 2017-06-15 | Stop reason: HOSPADM

## 2017-06-14 RX ORDER — ACETAMINOPHEN 325 MG/1
650 TABLET ORAL
Status: DISCONTINUED | OUTPATIENT
Start: 2017-06-14 | End: 2017-06-15 | Stop reason: HOSPADM

## 2017-06-14 RX ORDER — SODIUM CHLORIDE, SODIUM LACTATE, POTASSIUM CHLORIDE, CALCIUM CHLORIDE 600; 310; 30; 20 MG/100ML; MG/100ML; MG/100ML; MG/100ML
125 INJECTION, SOLUTION INTRAVENOUS CONTINUOUS
Status: DISCONTINUED | OUTPATIENT
Start: 2017-06-14 | End: 2017-06-15 | Stop reason: HOSPADM

## 2017-06-14 RX ORDER — PANTOPRAZOLE SODIUM 40 MG/1
40 TABLET, DELAYED RELEASE ORAL DAILY
Status: DISCONTINUED | OUTPATIENT
Start: 2017-06-15 | End: 2017-06-15 | Stop reason: HOSPADM

## 2017-06-14 RX ORDER — DOCUSATE SODIUM 100 MG/1
100 CAPSULE, LIQUID FILLED ORAL 2 TIMES DAILY
Status: DISCONTINUED | OUTPATIENT
Start: 2017-06-14 | End: 2017-06-15 | Stop reason: HOSPADM

## 2017-06-14 RX ORDER — HYDROMORPHONE HYDROCHLORIDE 1 MG/ML
INJECTION, SOLUTION INTRAMUSCULAR; INTRAVENOUS; SUBCUTANEOUS AS NEEDED
Status: DISCONTINUED | OUTPATIENT
Start: 2017-06-14 | End: 2017-06-14 | Stop reason: HOSPADM

## 2017-06-14 RX ORDER — DEXTROAMPHETAMINE SACCHARATE, AMPHETAMINE ASPARTATE, DEXTROAMPHETAMINE SULFATE AND AMPHETAMINE SULFATE 1.875; 1.875; 1.875; 1.875 MG/1; MG/1; MG/1; MG/1
30 TABLET ORAL 2 TIMES DAILY
Status: DISCONTINUED | OUTPATIENT
Start: 2017-06-14 | End: 2017-06-15 | Stop reason: HOSPADM

## 2017-06-14 RX ORDER — ONDANSETRON 4 MG/1
4 TABLET, ORALLY DISINTEGRATING ORAL
Status: DISCONTINUED | OUTPATIENT
Start: 2017-06-14 | End: 2017-06-15 | Stop reason: HOSPADM

## 2017-06-14 RX ORDER — DIAZEPAM 10 MG/2ML
5 INJECTION INTRAMUSCULAR ONCE
Status: COMPLETED | OUTPATIENT
Start: 2017-06-14 | End: 2017-06-14

## 2017-06-14 RX ORDER — LIDOCAINE HYDROCHLORIDE 20 MG/ML
INJECTION, SOLUTION EPIDURAL; INFILTRATION; INTRACAUDAL; PERINEURAL AS NEEDED
Status: DISCONTINUED | OUTPATIENT
Start: 2017-06-14 | End: 2017-06-14 | Stop reason: HOSPADM

## 2017-06-14 RX ORDER — PREGABALIN 100 MG/1
300 CAPSULE ORAL 2 TIMES DAILY
Status: DISCONTINUED | OUTPATIENT
Start: 2017-06-14 | End: 2017-06-15 | Stop reason: HOSPADM

## 2017-06-14 RX ORDER — OXYCODONE HYDROCHLORIDE 5 MG/1
10-15 TABLET ORAL
Status: DISCONTINUED | OUTPATIENT
Start: 2017-06-14 | End: 2017-06-15 | Stop reason: HOSPADM

## 2017-06-14 RX ORDER — LEVOTHYROXINE SODIUM 75 UG/1
75 TABLET ORAL
Status: DISCONTINUED | OUTPATIENT
Start: 2017-06-15 | End: 2017-06-15 | Stop reason: HOSPADM

## 2017-06-14 RX ORDER — DIPHENHYDRAMINE HCL 25 MG
25 CAPSULE ORAL
Status: DISCONTINUED | OUTPATIENT
Start: 2017-06-14 | End: 2017-06-15 | Stop reason: HOSPADM

## 2017-06-14 RX ORDER — SODIUM CHLORIDE 0.9 % (FLUSH) 0.9 %
5-10 SYRINGE (ML) INJECTION EVERY 8 HOURS
Status: DISCONTINUED | OUTPATIENT
Start: 2017-06-14 | End: 2017-06-15 | Stop reason: HOSPADM

## 2017-06-14 RX ORDER — HYDROMORPHONE HYDROCHLORIDE 1 MG/ML
0.5 INJECTION, SOLUTION INTRAMUSCULAR; INTRAVENOUS; SUBCUTANEOUS
Status: COMPLETED | OUTPATIENT
Start: 2017-06-14 | End: 2017-06-15

## 2017-06-14 RX ORDER — GUANFACINE HYDROCHLORIDE 1 MG/1
5 TABLET ORAL
Status: DISCONTINUED | OUTPATIENT
Start: 2017-06-14 | End: 2017-06-15 | Stop reason: HOSPADM

## 2017-06-14 RX ORDER — SODIUM CHLORIDE 0.9 % (FLUSH) 0.9 %
5-10 SYRINGE (ML) INJECTION AS NEEDED
Status: DISCONTINUED | OUTPATIENT
Start: 2017-06-14 | End: 2017-06-14 | Stop reason: HOSPADM

## 2017-06-14 RX ORDER — MELOXICAM 7.5 MG/1
TABLET ORAL DAILY
COMMUNITY
End: 2017-06-15

## 2017-06-14 RX ORDER — SODIUM CHLORIDE 0.9 % (FLUSH) 0.9 %
5-10 SYRINGE (ML) INJECTION AS NEEDED
Status: DISCONTINUED | OUTPATIENT
Start: 2017-06-14 | End: 2017-06-15 | Stop reason: HOSPADM

## 2017-06-14 RX ORDER — TIZANIDINE 4 MG/1
4 TABLET ORAL
Status: DISCONTINUED | OUTPATIENT
Start: 2017-06-14 | End: 2017-06-15 | Stop reason: HOSPADM

## 2017-06-14 RX ORDER — ROCURONIUM BROMIDE 10 MG/ML
INJECTION, SOLUTION INTRAVENOUS AS NEEDED
Status: DISCONTINUED | OUTPATIENT
Start: 2017-06-14 | End: 2017-06-14 | Stop reason: HOSPADM

## 2017-06-14 RX ORDER — ONDANSETRON 2 MG/ML
INJECTION INTRAMUSCULAR; INTRAVENOUS AS NEEDED
Status: DISCONTINUED | OUTPATIENT
Start: 2017-06-14 | End: 2017-06-14 | Stop reason: HOSPADM

## 2017-06-14 RX ORDER — PROPOFOL 10 MG/ML
INJECTION, EMULSION INTRAVENOUS AS NEEDED
Status: DISCONTINUED | OUTPATIENT
Start: 2017-06-14 | End: 2017-06-14 | Stop reason: HOSPADM

## 2017-06-14 RX ORDER — CLONAZEPAM 0.5 MG/1
1 TABLET ORAL
Status: DISCONTINUED | OUTPATIENT
Start: 2017-06-14 | End: 2017-06-15 | Stop reason: HOSPADM

## 2017-06-14 RX ORDER — FENTANYL CITRATE 50 UG/ML
50 INJECTION, SOLUTION INTRAMUSCULAR; INTRAVENOUS
Status: DISCONTINUED | OUTPATIENT
Start: 2017-06-14 | End: 2017-06-14 | Stop reason: HOSPADM

## 2017-06-14 RX ORDER — SUCCINYLCHOLINE CHLORIDE 20 MG/ML
INJECTION INTRAMUSCULAR; INTRAVENOUS AS NEEDED
Status: DISCONTINUED | OUTPATIENT
Start: 2017-06-14 | End: 2017-06-14 | Stop reason: HOSPADM

## 2017-06-14 RX ORDER — SODIUM CHLORIDE, SODIUM LACTATE, POTASSIUM CHLORIDE, CALCIUM CHLORIDE 600; 310; 30; 20 MG/100ML; MG/100ML; MG/100ML; MG/100ML
125 INJECTION, SOLUTION INTRAVENOUS CONTINUOUS
Status: DISCONTINUED | OUTPATIENT
Start: 2017-06-14 | End: 2017-06-14 | Stop reason: HOSPADM

## 2017-06-14 RX ORDER — FENTANYL CITRATE 50 UG/ML
INJECTION, SOLUTION INTRAMUSCULAR; INTRAVENOUS AS NEEDED
Status: DISCONTINUED | OUTPATIENT
Start: 2017-06-14 | End: 2017-06-14 | Stop reason: HOSPADM

## 2017-06-14 RX ORDER — SODIUM CHLORIDE, SODIUM LACTATE, POTASSIUM CHLORIDE, CALCIUM CHLORIDE 600; 310; 30; 20 MG/100ML; MG/100ML; MG/100ML; MG/100ML
100 INJECTION, SOLUTION INTRAVENOUS CONTINUOUS
Status: DISCONTINUED | OUTPATIENT
Start: 2017-06-14 | End: 2017-06-15 | Stop reason: HOSPADM

## 2017-06-14 RX ORDER — GUANFACINE HYDROCHLORIDE 1 MG/1
5 TABLET ORAL
Status: DISCONTINUED | OUTPATIENT
Start: 2017-06-14 | End: 2017-06-14

## 2017-06-14 RX ORDER — CLONAZEPAM 0.5 MG/1
2 TABLET ORAL
Status: DISCONTINUED | OUTPATIENT
Start: 2017-06-14 | End: 2017-06-15 | Stop reason: HOSPADM

## 2017-06-14 RX ORDER — GLYCOPYRROLATE 0.2 MG/ML
INJECTION INTRAMUSCULAR; INTRAVENOUS AS NEEDED
Status: DISCONTINUED | OUTPATIENT
Start: 2017-06-14 | End: 2017-06-14 | Stop reason: HOSPADM

## 2017-06-14 RX ADMIN — ROCURONIUM BROMIDE 10 MG: 10 INJECTION, SOLUTION INTRAVENOUS at 14:01

## 2017-06-14 RX ADMIN — SODIUM CHLORIDE, SODIUM LACTATE, POTASSIUM CHLORIDE, AND CALCIUM CHLORIDE 125 ML/HR: 600; 310; 30; 20 INJECTION, SOLUTION INTRAVENOUS at 19:12

## 2017-06-14 RX ADMIN — MIDAZOLAM HYDROCHLORIDE 5 MG: 1 INJECTION, SOLUTION INTRAMUSCULAR; INTRAVENOUS at 14:01

## 2017-06-14 RX ADMIN — HYDROMORPHONE HYDROCHLORIDE 0.5 MG: 1 INJECTION, SOLUTION INTRAMUSCULAR; INTRAVENOUS; SUBCUTANEOUS at 11:28

## 2017-06-14 RX ADMIN — MIDAZOLAM HYDROCHLORIDE 5 MG: 1 INJECTION, SOLUTION INTRAMUSCULAR; INTRAVENOUS at 13:57

## 2017-06-14 RX ADMIN — FENTANYL CITRATE 50 MCG: 50 INJECTION, SOLUTION INTRAMUSCULAR; INTRAVENOUS at 16:30

## 2017-06-14 RX ADMIN — LIDOCAINE HYDROCHLORIDE 100 MG: 20 INJECTION, SOLUTION EPIDURAL; INFILTRATION; INTRACAUDAL; PERINEURAL at 14:01

## 2017-06-14 RX ADMIN — PREGABALIN 300 MG: 100 CAPSULE ORAL at 21:20

## 2017-06-14 RX ADMIN — HYDROMORPHONE HYDROCHLORIDE 1 MG: 1 INJECTION, SOLUTION INTRAMUSCULAR; INTRAVENOUS; SUBCUTANEOUS at 13:57

## 2017-06-14 RX ADMIN — HYDROMORPHONE HYDROCHLORIDE 0.5 MG: 1 INJECTION, SOLUTION INTRAMUSCULAR; INTRAVENOUS; SUBCUTANEOUS at 13:43

## 2017-06-14 RX ADMIN — DEXTROAMPHETAMINE SACCHARATE, AMPHETAMINE ASPARTATE, DEXTROAMPHETAMINE SULFATE AND AMPHETAMINE SULFATE 30 MG: 1.875; 1.875; 1.875; 1.875 TABLET ORAL at 21:21

## 2017-06-14 RX ADMIN — VANCOMYCIN HYDROCHLORIDE 1.5 G: 10 INJECTION, POWDER, LYOPHILIZED, FOR SOLUTION INTRAVENOUS at 14:09

## 2017-06-14 RX ADMIN — HYDROMORPHONE HYDROCHLORIDE 0.5 MG: 1 INJECTION, SOLUTION INTRAMUSCULAR; INTRAVENOUS; SUBCUTANEOUS at 16:48

## 2017-06-14 RX ADMIN — HYDROMORPHONE HYDROCHLORIDE 0.5 MG: 1 INJECTION, SOLUTION INTRAMUSCULAR; INTRAVENOUS; SUBCUTANEOUS at 11:58

## 2017-06-14 RX ADMIN — GLYCOPYRROLATE 0.2 MG: 0.2 INJECTION INTRAMUSCULAR; INTRAVENOUS at 13:57

## 2017-06-14 RX ADMIN — SUCCINYLCHOLINE CHLORIDE 180 MG: 20 INJECTION INTRAMUSCULAR; INTRAVENOUS at 14:02

## 2017-06-14 RX ADMIN — SODIUM CHLORIDE, SODIUM LACTATE, POTASSIUM CHLORIDE, AND CALCIUM CHLORIDE 125 ML/HR: 600; 310; 30; 20 INJECTION, SOLUTION INTRAVENOUS at 16:37

## 2017-06-14 RX ADMIN — TIZANIDINE 4 MG: 4 TABLET ORAL at 23:21

## 2017-06-14 RX ADMIN — PROPOFOL 50 MG: 10 INJECTION, EMULSION INTRAVENOUS at 15:35

## 2017-06-14 RX ADMIN — SODIUM CHLORIDE, SODIUM LACTATE, POTASSIUM CHLORIDE, AND CALCIUM CHLORIDE 125 ML/HR: 600; 310; 30; 20 INJECTION, SOLUTION INTRAVENOUS at 10:48

## 2017-06-14 RX ADMIN — HYDROMORPHONE HYDROCHLORIDE 0.5 MG: 1 INJECTION, SOLUTION INTRAMUSCULAR; INTRAVENOUS; SUBCUTANEOUS at 17:30

## 2017-06-14 RX ADMIN — GUANFACINE HYDROCHLORIDE 5 MG: 1 TABLET ORAL at 22:13

## 2017-06-14 RX ADMIN — HYDROMORPHONE HYDROCHLORIDE: 10 INJECTION, SOLUTION INTRAMUSCULAR; INTRAVENOUS; SUBCUTANEOUS at 16:09

## 2017-06-14 RX ADMIN — PROPOFOL 200 MG: 10 INJECTION, EMULSION INTRAVENOUS at 14:01

## 2017-06-14 RX ADMIN — CLONAZEPAM 2 MG: 0.5 TABLET ORAL at 21:21

## 2017-06-14 RX ADMIN — FENTANYL CITRATE 50 MCG: 50 INJECTION, SOLUTION INTRAMUSCULAR; INTRAVENOUS at 16:39

## 2017-06-14 RX ADMIN — SODIUM CHLORIDE, SODIUM LACTATE, POTASSIUM CHLORIDE, AND CALCIUM CHLORIDE: 600; 310; 30; 20 INJECTION, SOLUTION INTRAVENOUS at 14:42

## 2017-06-14 RX ADMIN — FENTANYL CITRATE 100 MCG: 50 INJECTION, SOLUTION INTRAMUSCULAR; INTRAVENOUS at 14:56

## 2017-06-14 RX ADMIN — PROPOFOL 50 MG: 10 INJECTION, EMULSION INTRAVENOUS at 15:37

## 2017-06-14 RX ADMIN — HYDROMORPHONE HYDROCHLORIDE 0.5 MG: 1 INJECTION, SOLUTION INTRAMUSCULAR; INTRAVENOUS; SUBCUTANEOUS at 13:06

## 2017-06-14 RX ADMIN — DOCUSATE SODIUM 100 MG: 100 CAPSULE, LIQUID FILLED ORAL at 21:20

## 2017-06-14 RX ADMIN — SODIUM CHLORIDE, SODIUM LACTATE, POTASSIUM CHLORIDE, AND CALCIUM CHLORIDE 125 ML/HR: 600; 310; 30; 20 INJECTION, SOLUTION INTRAVENOUS at 18:33

## 2017-06-14 RX ADMIN — DIPHENHYDRAMINE HYDROCHLORIDE 25 MG: 25 CAPSULE ORAL at 20:33

## 2017-06-14 RX ADMIN — DIAZEPAM 5 MG: 5 INJECTION, SOLUTION INTRAMUSCULAR; INTRAVENOUS at 17:12

## 2017-06-14 RX ADMIN — SODIUM CHLORIDE, SODIUM LACTATE, POTASSIUM CHLORIDE, AND CALCIUM CHLORIDE 100 ML/HR: 600; 310; 30; 20 INJECTION, SOLUTION INTRAVENOUS at 17:00

## 2017-06-14 RX ADMIN — ONDANSETRON 4 MG: 2 INJECTION INTRAMUSCULAR; INTRAVENOUS at 15:12

## 2017-06-14 RX ADMIN — GLYCOPYRROLATE 0.2 MG: 0.2 INJECTION INTRAMUSCULAR; INTRAVENOUS at 15:19

## 2017-06-14 RX ADMIN — SODIUM CHLORIDE, SODIUM LACTATE, POTASSIUM CHLORIDE, AND CALCIUM CHLORIDE: 600; 310; 30; 20 INJECTION, SOLUTION INTRAVENOUS at 16:35

## 2017-06-14 NOTE — PERIOP NOTES
Pt stated the he starting to feel the pain medication starting to work. Family at bedside, Pulse ox on.

## 2017-06-14 NOTE — PERIOP NOTES
Bedside report to receiving nurse Chrissie, current vital signs noted below. Dressing dry and intact. Family in waiting room. No further questions from receiving nurse.    Visit Vitals    /77    Pulse 60    Temp 97.7 °F (36.5 °C)    Resp 16    Ht 6' 2\" (1.88 m)    Wt 107.6 kg (237 lb 3 oz)    SpO2 100%    BMI 30.45 kg/m2

## 2017-06-14 NOTE — PROGRESS NOTES
Pharmacy - Vancomycin Dosing  Consult provided for this 40y.o. year old ,male for indication of Lumbar Wound. Therapy day 1        Wt Readings from Last 1 Encounters:   06/14/17 107.6 kg (237 lb 3 oz)       Ht Readings from Last 1 Encounters:   06/14/17 188 cm (74\")     Dosing Weight:  107.6 kg     Date:   6/14/17  Lab Results   Component Value Date/Time    Creatinine 0.95 05/12/2017 03:55 PM     creatinine clearance  >100 ml/min    white blood cell count   6.8    Patient received Vancomycin 1500 mg IV at 14:09 today. Will continue Vancomycin 1500 mg IV q12hrs. Vancomycin Trough Level after 4-5 doses infused. Dose calculated to approximate a therapeutic trough of 15-20 mcg/mL. Pharmacy to follow daily and will make changes to dose and/or frequency based on clinical status.       [address] [facility], [address] [place] [address]

## 2017-06-14 NOTE — PERIOP NOTES
TRANSFER - OUT REPORT:    Verbal report given to Beatriz PARHAM (name) on Cristin Coleman  being transferred to 2 S (unit) for routine progression of care       Report consisted of patients Situation, Background, Assessment and   Recommendations(SBAR). Information from the following report(s) SBAR, Kardex, OR Summary and Procedure Summary was reviewed with the receiving nurse. Lines:   Peripheral IV 06/14/17 Right Hand (Active)   Site Assessment Clean, dry, & intact 6/14/2017  4:00 PM   Phlebitis Assessment 0 6/14/2017  4:00 PM   Infiltration Assessment 0 6/14/2017  4:00 PM   Dressing Status Clean, dry, & intact 6/14/2017  4:00 PM   Dressing Type Transparent;Tape 6/14/2017  4:00 PM   Hub Color/Line Status Infusing 6/14/2017  4:00 PM        Opportunity for questions and clarification was provided.       Patient transported with:   O2 @ 2 liters  Registered Nurse

## 2017-06-14 NOTE — PERIOP NOTES
Paged DR Manual Fickle with patient complains of pain 10/10 after starting the PCA pump, and given Fentanyl as ordered. New orders in for Dilaudid IV PRN, see mar.

## 2017-06-14 NOTE — INTERVAL H&P NOTE
H&P Update:  Constanza Álvarez was seen and examined. History and physical has been reviewed. The patient has been examined.  There have been no significant clinical changes since the completion of the originally dated History and Physical.    Signed By: Maude Ragsdale MD     June 14, 2017 7:55 AM

## 2017-06-14 NOTE — IP AVS SNAPSHOT
Allyn Krishna 
 
 
 [address] [facility] [address] 
[phone] Patient: Bishop Lopes MRN: [id number] XWH:[birth year] You are allergic to the following Allergen Reactions Prozac (Fluoxetine) Unknown (comments) Hives or jittery-pt can't remember Augmentin (Amoxicillin-Pot Clavulanate) Rash Geodon (Ziprasidone Hcl) Other (comments) diskensia reported by pt Mastisol Adhesive (Gum Jtdskc-Uddcby-Donh-Alcohol) Rash Morphine Itching Morphine -IR, not ER Quetiapine Palpitations Trazodone Hives Vicodin (Hydrocodone-Acetaminophen) Hives Recent Documentation Height Weight BMI Smoking Status 1.88 m 107.6 kg 30.45 kg/m2 Former Smoker Unresulted Labs Order Current Status CRP, HIGH SENSITIVITY Collected (06/15/17 0600) CRP, HIGH SENSITIVITY In process Emergency Contacts Name Discharge Info Relation Home Work Mobile Salvador Altamirano CAREGIVER [3] Friend [5]   [phone] About your hospitalization You were admitted on:  June 14, 2017 You last received care in the:  [facility] [address] You were discharged on:  Petra 15, 2017 Unit phone number:  [phone] Why you were hospitalized Your primary diagnosis was:  Lumbar Surgical Wound Fluid Collection Your diagnoses also included:  Mrsa (Methicillin Resistant Staphylococcus Aureus) Infection Providers Seen During Your Hospitalizations Provider Role Specialty Primary office phone Jorge Alberto Lozoya MD Attending Provider Orthopedic Surgery [phone] Your Primary Care Physician (PCP) Primary Care Physician Office Phone Office Fax Dor[facility]a [facility] [phone] Follow-up Information Follow up With Details Comments Contact Info Jorge Alberto Lozoya MD On 6/23/2017 Follow up appointment @ 9:15am Bar[facility] [address] [facility] [address] 
[phone] Smith Brown MD   [address] [facility] [place] [address] 
[phone] [address] to continue managing your healthcare needs. [phone] [facility] [employer] [facility]  Chosen to continue managing your healthcare needs. [phone] Current Discharge Medication List  
  
CONTINUE these medications which have NOT CHANGED Dose & Instructions Dispensing Information Comments Morning Noon Evening Bedtime CIALIS 5 mg tablet Generic drug:  tadalafil Your last dose was: Your next dose is:    
   
   
 Dose:  5 mg Take 5 mg by mouth daily. Indications: Erectile Dysfunction Refills:  0  
     
   
   
   
  
 dextroamphetamine-amphetamine 30 mg tablet Commonly known as:  ADDERALL Your last dose was: Your next dose is:    
   
   
 Dose:  30 mg Take 30 mg by mouth two (2) times a day. Indications: ATTENTION-DEFICIT HYPERACTIVITY DISORDER Refills:  0  
     
   
   
   
  
 esomeprazole 40 mg capsule Commonly known as:  Yola Raid Your last dose was: Your next dose is:    
   
   
 Dose:  40 mg Take 40 mg by mouth daily. Indications: GASTROESOPHAGEAL REFLUX Refills:  0  
     
   
   
   
  
 gabapentin 100 mg capsule Commonly known as:  NEURONTIN Your last dose was: Your next dose is:    
   
   
 Dose:  300 mg Take 300 mg by mouth three (3) times daily. Indications: NEUROPATHIC PAIN Refills:  0  
     
   
   
   
  
 * KlonoPIN 2 mg tablet Generic drug:  clonazePAM  
   
Your last dose was: Your next dose is:    
   
   
 Dose:  2 mg Take 2 mg by mouth nightly. Indications: sleep Refills:  0  
     
   
   
   
  
 * KlonoPIN 1 mg tablet Generic drug:  clonazePAM  
   
Your last dose was: Your next dose is:    
   
   
 Dose:  1 mg Take 1 mg by mouth three (3) times daily as needed. Indications: anxiety Refills:  0  
     
   
   
   
  
 levothyroxine 75 mcg tablet Commonly known as:  SYNTHROID Your last dose was: Your next dose is:    
   
   
 Dose:  75 mcg Take 75 mcg by mouth Daily (before breakfast). Indications: hypothyroidism Refills:  0  
     
   
   
   
  
 oxyCODONE IR 10 mg Tab immediate release tablet Commonly known as:  Rudy Felder Your last dose was: Your next dose is:    
   
   
 Dose:  10-15 mg Take 1-1.5 Tabs by mouth every four (4) hours as needed. Max Daily Amount: 90 mg. Quantity:  90 Tab Refills:  0  
     
   
   
   
  
 pregabalin 300 mg capsule Commonly known as:  Deborah Brand Your last dose was: Your next dose is:    
   
   
 Dose:  300 mg Take 300 mg by mouth two (2) times a day. Indications: GENERALIZED ANXIETY DISORDER, neuropathy Refills:  0  
     
   
   
   
  
 tamsulosin 0.4 mg capsule Commonly known as:  FLOMAX Your last dose was: Your next dose is:    
   
   
 Dose:  0.4 mg Take 0.4 mg by mouth daily. Indications: neurogenic bladder Refills:  0  
     
   
   
   
  
 TENEX 2 mg IR tablet Generic drug:  guanFACINE IR Your last dose was: Your next dose is:    
   
   
 Dose:  5 mg Take 5 mg by mouth nightly as needed. Indications: ptsd Refills:  0  
     
   
   
   
  
 tiZANidine 4 mg tablet Commonly known as:  George Saunders Your last dose was: Your next dose is:    
   
   
 Dose:  4 mg Take 1 Tab by mouth three (3) times daily as needed. Indications: MUSCLE SPASM Quantity:  60 Tab Refills:  0  
     
   
   
   
  
 vortioxetine 20 mg tablet Commonly known as:  TRINTELLIX Your last dose was: Your next dose is:    
   
   
 Dose:  20 mg Take 20 mg by mouth daily. Indications: major depressive disorder, ptsd Refills:  0 * Notice: This list has 2 medication(s) that are the same as other medications prescribed for you. Read the directions carefully, and ask your doctor or other care provider to review them with you. STOP taking these medications   
 meloxicam 7.5 mg tablet Commonly known as:  MOBIC Where to Get Your Medications Information on where to get these meds will be given to you by the nurse or doctor. ! Ask your nurse or doctor about these medications  
  oxyCODONE IR 10 mg Tab immediate release tablet Discharge Instructions [address] Dr. Rojelio Gallagher *YOU MUST AVOID SMOKING OR BEING AROUND ANYONE WHO SMOKES. AVOID ALL PRODUCTS THAT CONTAIN NICOTINE. DO NOT TAKE IBUPROFEN OR ANTI--INFLAMMATORIES, AS THESE MAY ALTER THE HEALING OF THE FUSION. ACTIVITIES : 
*The first week after surgery 1. You may be up and walking about the house. 2.  Activities around the house, such as washing dishes, fixing light meals, and your own personal care are fine. 3.  Avoid strenuous activities, such as vacuuming, lifting laundry or grocery bags. 4.  Do not lift anything heavier than 1 gallon of milk (or about 5-8 pounds). 5.  Do not bend over to  items from the ground level until 3 months post-op. *Week 2 and beyond 1. You may gradually increase your activities, but still avoid heavy lifting, pushing/pulling. 2.  Walking is the best way to rebuild strength and stamina. Start SLOWLY and gradually increase the distance a little every week. 3.  Walk at a pace that avoids fatigue or severe pain. Do not try to walk several blocks the first day! As you increase the distance, you may feel tired. If so, stop and rest.  
4.  You should be able to walk several blocks by your first clinic visit. 5.  Follow-up with Dr. Zaria Winters in 10-14 days. BATHING and INCISION CARE: 
1.  The incision may be tender to touch or feel numb: this is normal.  
 2.  Keep the incision clean and dry. Do not get incision wet for 5 days. The incision will be closed with sutures under the skin and the skin will be glued. 3.  Do not apply any lotions, ointments or oils on the incision. 4.  If you notice any excessive swelling, redness, or persistent drainage around the incision, notify the office immediately. DRIVIN. You should not drive until after your follow-up appointment. 2.  You can be in a vehicle for short distances, but if you travel any long distance, please stop about every 30 minutes and walk/stretch. 3.  You should NEVER drive while taking narcotic medication. RETURN TO WORK : 
1. The decision to return to work will be determined on an individual basis. 2.  Many people who have a strenuous job (construction, heavy labor, etc) may need to be off work for up to 12 weeks. 3.  If you need a work note, please let us know as soon as possible, and not the same day you are planning to return to work. NUTRITION : 
1.  Good nutrition is an essential part of healing. 2.  You should eat a balanced diet each day, including fruits, vegetables, dairy products and protein. 3.  Remember to drink plenty of water. 4.  If you have not had a bowel movement within 3 days of surgery, you will need to use a laxative or suppository that can be obtained over-the-counter at your local pharmacy. BONE STIMULATOR: 
1. A spinal bone stimulator may be prescribed for you under certain situations. 2.  A nurse will call you if one has been requested and discuss its use for approximately 4-6 months post-op every day. 3.  This device assists in bone healing and fusion. MEDICATIONS - 
1. You may resume the medications you were taking before surgery, with the general exception of (or DO NOT TAKE) non-steroidal anti-inflammatory medications, such as Motrin, Aleve, Advil Naprosyn, Ibuprofen or aspirin. 2.  You will receive a prescription for pain medication at discharge from the hospital. The pain medication works best if taken before the pain becomes severe. 3.  To reduce stomach upset, always take the medication with food. 4.  Begin to wean yourself off the pain medication during the second week after discharge. 5.  If you need a refill, please call the office during working hours at least 2 days before your prescription runs out. Do not wait until your bottle is empty to call for a refill. 6.  DO NOT drive if you are taking narcotic pain medications. HOME HEALTH CARE: 
1.   A home health care service has been set-up for you to help assist you once you leave the hospital. 
2.  They will contact you either before you leave the hospital or within 24 hours once you have been discharged home. 3. A nurse will assist you with your dressing changes and a Physical Therapist with help you with your therapy needs. CALL THE OFFICE: 
? If you have severe pain unrelieved by the medications, new numbness or tingling in your legs; 
? If you have a fever of 101.0°F or greater;  
? If you notice excessive swelling, redness, or persistent drainage from the incision or IV site; [facility] office number is [phone] from 8:00am to 5:00pm Monday through Friday. After 5:00pm, on weekends, or holidays, please leave a message with our answering service and the doctor on-call will get back to you shortly. Discharge Orders None Introducing [facility]! Gloria Lomas introduces [employer] patient portal. Now you can access parts of your medical record, email your doctor's office, and request medication refills online. 1. In your internet browser, go to [email or web address] 2. Click on the First Time User? Click Here link in the Sign In box. You will see the New Member Sign Up page. 3. Enter your [employer] Access Code exactly as it appears below.  You will not need to use this code after youve completed the sign-up process. If you do not sign up before the expiration date, you must request a new code. · [employer] Access Code: [email or web address] Expires: 8/6/2017  6:27 PM 
 
4. Enter the last four digits of your Social Security Number (xxxx) and Date of Birth (mm/dd/yyyy) as indicated and click Submit. You will be taken to the next sign-up page. 5. Create a [employer] ID. This will be your [employer] login ID and cannot be changed, so think of one that is secure and easy to remember. 6. Create a [employer] password. You can change your password at any time. 7. Enter your Password Reset Question and Answer. This can be used at a later time if you forget your password. 8. Enter your e-mail address. You will receive e-mail notification when new information is available in [address] 9. Click Sign Up. You can now view and download portions of your medical record. 10. Click the Download Summary menu link to download a portable copy of your medical information. If you have questions, please visit the Frequently Asked Questions section of the [employer] website. Remember, [employer] is NOT to be used for urgent needs. For medical emergencies, dial 911. Now available from your iPhone and Android! General Information Please provide this summary of care documentation to your next provider. Patient Signature:  ____________________________________________________________ Date:  ____________________________________________________________  
  
Dipti Brought Provider Signature:  ____________________________________________________________ Date:  ____________________________________________________________

## 2017-06-14 NOTE — OP NOTES
Patient: Christopher Cox MRN: [id number]  SSN: [id number]    YOB: 1970  Age: 40 y.o. Sex: male        Date of Procedure: 6/14/2017   Preoperative Diagnosis: POST-OP WOUND INFECTION OF LUMBAR WOUND,REFLUX,HYPOTHYROIDISM,PERSONAL HX OF TRAUMATIC BRAIN INJURY,POST TRAUMATIC STRESS DISORDER  Postoperative Diagnosis: POST-OP WOUND INFECTION OF LUMBAR WOUND,REFLUX,HYPOTHYROIDISM,PERSONAL HX OF TRAUMATIC BRAIN INJURY,POST TRAUMATIC STRESS DISORDER    Procedure: Procedure(s):  INCISION AND DRAINAGE OF LUMBAR WOUND INFECTION  Surgeon(s) and Role:     * Satish Springer MD - Primary  Assistant: Brett Murphy PA-C  Anesthesia: General   Estimated Blood Loss: minimal  Fluids: 1000cc  Specimens: * No specimens in log *   Findings: same  Complications: none  Implants: * No implants in log *      Indications: This is a 40y.o. year-old male who presents with wound drainage after lumbar  decompression 5/2217. She has had some drainage from the  wound and in the last week has noted some pus and comes in now for  irrigation and debridement. Cultures from the office have grown MRSA. DESCRIPTION OF PROCEDURE: After verification, the patient was taken to the  operating room, placed supine on the table. General endotracheal tube  anesthesia induced, 1 gram of Vancomycin was given. She was then rotated prone  onto the Pedro frame. The lumbar spine was prepped and draped in the usual  fashion. .    Midline incision made through a previous incision, taken down to and  through the subcutaneous layer, noted to have a small pocket of pus  subcutaneously. This was tracked down to above the fascia. There was a  break in the fascia throughout the wound itself but this did not go down to the laminectomy site. There was some  granulomatous tissue, this was removed. The wound was then irrigated with 6  liters of pulse lavage irrigation, the last 3 liters containing bacitracin.   There was debridement done between each 3 liter bag down to bleeding soft  tissue. The fascia was closed with #1 PDS suture. A drain was then placed. The subcutaneous tissue approximated with 2-0 PDS sutures. Skin was  approximated with 2-0 Nylon sutures. Sterile dressing was applied. The patient  tolerated the procedure well and taken to recovery in good condition.

## 2017-06-14 NOTE — PERIOP NOTES
Spoke  to [facility] PA regarding patient unable to void, patient to straight cath himself as needed, ordered received to increase patient PCA basal to 0.2 mg due to patient uncontrolled pain.

## 2017-06-14 NOTE — PROGRESS NOTES
1908-Assessment complete at this time. Pt A&O x4. Lung sounds clear bilaterally. Pt has +PP bilaterally. Pt denies tingling and numbness in LE's. Pain reported a  7/10 on pain scale. ABD and tape dressing to lower, mid back C/D/I. SCD's and JOSH's applied bilat. Hemovac to lower backl patent and draining. 20G to rt hand patent and infusing. Skin warm and dry. Abdomen soft and non-tender. Bowel sounds active x4 quadrants WDL's. Patient resting with bed in lowest position. Call light in reach. 2015-Pt stated he was allergic to all tape products and that the tape around his surgical site was burning which was starting to give him anxiety. He stated \"I will break out in welts if the tape stays on\". Pt request bandage be replaced with something else. Paged Dr to address pt concerns at this time. 2022-Spoke with Dr Smita Moreno about pt situation and concerns. Dr Smita Moreno stated it was fine to change the bandage to mepilex and readdress with Dr. Valjean Scheuermann in the morning. 2033-Pt complaining of burning/itching around incision site where tape is, request benadryl. 25mg Benadryl given per [facility]    2035-ABD pad with tape dressing changed, 1 square mepilex, and three long mepilex applied to surgical site, 4x4 under bandage left in place from original dressing. Pt stated \"he immediately felt better\". Tape was also removed around IV site, tegaderm over site with kurilex. 0236-CHG wipes applied at this time. Gown and bed pad changed    0304-Shift reassessment complete at this time. No changes noted to previous assessment. Pain reported a 6/10 on pain scale. PCA encouraged. See flow sheet for details. Pt resting with bed in lowest position. Family members at bedside. Call light in reach. 0309-Pt reports break though anxiety-I mg PO Klonopin giver per [facility].

## 2017-06-14 NOTE — IP AVS SNAPSHOT
Current Discharge Medication List  
  
CONTINUE these medications which have NOT CHANGED Dose & Instructions Dispensing Information Comments Morning Noon Evening Bedtime CIALIS 5 mg tablet Generic drug:  tadalafil Your last dose was: Your next dose is:    
   
   
 Dose:  5 mg Take 5 mg by mouth daily. Indications: Erectile Dysfunction Refills:  0  
     
   
   
   
  
 dextroamphetamine-amphetamine 30 mg tablet Commonly known as:  ADDERALL Your last dose was: Your next dose is:    
   
   
 Dose:  30 mg Take 30 mg by mouth two (2) times a day. Indications: ATTENTION-DEFICIT HYPERACTIVITY DISORDER Refills:  0  
     
   
   
   
  
 esomeprazole 40 mg capsule Commonly known as:  Elham Gustafson Your last dose was: Your next dose is:    
   
   
 Dose:  40 mg Take 40 mg by mouth daily. Indications: GASTROESOPHAGEAL REFLUX Refills:  0  
     
   
   
   
  
 gabapentin 100 mg capsule Commonly known as:  NEURONTIN Your last dose was: Your next dose is:    
   
   
 Dose:  300 mg Take 300 mg by mouth three (3) times daily. Indications: NEUROPATHIC PAIN Refills:  0  
     
   
   
   
  
 * KlonoPIN 2 mg tablet Generic drug:  clonazePAM  
   
Your last dose was: Your next dose is:    
   
   
 Dose:  2 mg Take 2 mg by mouth nightly. Indications: sleep Refills:  0  
     
   
   
   
  
 * KlonoPIN 1 mg tablet Generic drug:  clonazePAM  
   
Your last dose was: Your next dose is:    
   
   
 Dose:  1 mg Take 1 mg by mouth three (3) times daily as needed. Indications: anxiety Refills:  0  
     
   
   
   
  
 levothyroxine 75 mcg tablet Commonly known as:  SYNTHROID Your last dose was: Your next dose is:    
   
   
 Dose:  75 mcg Take 75 mcg by mouth Daily (before breakfast). Indications: hypothyroidism Refills:  0 oxyCODONE IR 10 mg Tab immediate release tablet Commonly known as:  Louie Sweet Your last dose was: Your next dose is:    
   
   
 Dose:  10-15 mg Take 1-1.5 Tabs by mouth every four (4) hours as needed. Max Daily Amount: 90 mg. Quantity:  90 Tab Refills:  0  
     
   
   
   
  
 pregabalin 300 mg capsule Commonly known as:  Kasier Tilleyshila Your last dose was: Your next dose is:    
   
   
 Dose:  300 mg Take 300 mg by mouth two (2) times a day. Indications: GENERALIZED ANXIETY DISORDER, neuropathy Refills:  0  
     
   
   
   
  
 tamsulosin 0.4 mg capsule Commonly known as:  FLOMAX Your last dose was: Your next dose is:    
   
   
 Dose:  0.4 mg Take 0.4 mg by mouth daily. Indications: neurogenic bladder Refills:  0  
     
   
   
   
  
 TENEX 2 mg IR tablet Generic drug:  guanFACINE IR Your last dose was: Your next dose is:    
   
   
 Dose:  5 mg Take 5 mg by mouth nightly as needed. Indications: ptsd Refills:  0  
     
   
   
   
  
 tiZANidine 4 mg tablet Commonly known as:  Criselda Bolk Your last dose was: Your next dose is:    
   
   
 Dose:  4 mg Take 1 Tab by mouth three (3) times daily as needed. Indications: MUSCLE SPASM Quantity:  60 Tab Refills:  0  
     
   
   
   
  
 vortioxetine 20 mg tablet Commonly known as:  TRINTELLIX Your last dose was: Your next dose is:    
   
   
 Dose:  20 mg Take 20 mg by mouth daily. Indications: major depressive disorder, ptsd Refills:  0  
     
   
   
   
  
 * Notice: This list has 2 medication(s) that are the same as other medications prescribed for you. Read the directions carefully, and ask your doctor or other care provider to review them with you. STOP taking these medications   
 meloxicam 7.5 mg tablet Commonly known as:  MOBIC Where to Get Your Medications Information on where to get these meds will be given to you by the nurse or doctor. ! Ask your nurse or doctor about these medications  
  oxyCODONE IR 10 mg Tab immediate release tablet

## 2017-06-14 NOTE — ANESTHESIA POSTPROCEDURE EVALUATION
Post-Anesthesia Evaluation and Assessment    Cardiovascular Function/Vital Signs  Visit Vitals    /84    Pulse 66    Temp 36.1 °C (97 °F)    Resp 11    Ht 6' 2\" (1.88 m)    Wt 107.6 kg (237 lb 3 oz)    SpO2 100%    BMI 30.45 kg/m2       Patient is status post Procedure(s):  INCISION AND DRAINAGE OF LUMBAR WOUND INFECTION. Nausea/Vomiting: Controlled. Postoperative hydration reviewed and adequate. Pain:  Pain Scale 1: FLACC (06/14/17 9273)  Pain Intensity 1: 2 (06/14/17 1805)   Managed. Neurological Status:   Neuro (WDL): Within Defined Limits (06/14/17 1649)   At baseline. Mental Status and Level of Consciousness: Arousable. Pulmonary Status:   O2 Device: Room air (06/14/17 1823)   Adequate oxygenation and airway patent. Complications related to anesthesia: None    Post-anesthesia assessment completed. No concerns. Patient has met all discharge requirements.     Signed By: Barry Homans, CRNA    June 14, 2017

## 2017-06-14 NOTE — ANESTHESIA PREPROCEDURE EVALUATION
Anesthetic History   No history of anesthetic complications            Review of Systems / Medical History  Patient summary reviewed, nursing notes reviewed and pertinent labs reviewed    Pulmonary        Sleep apnea           Neuro/Psych     seizures: well controlled         Cardiovascular    Hypertension              Exercise tolerance: >4 METS     GI/Hepatic/Renal     GERD           Endo/Other      Hypothyroidism  Morbid obesity and arthritis     Other Findings              Physical Exam    Airway  Mallampati: II  TM Distance: 4 - 6 cm  Neck ROM: normal range of motion   Mouth opening: Normal    Comments: Full beard Cardiovascular  Regular rate and rhythm,  S1 and S2 normal,  no murmur, click, rub, or gallop  Rhythm: regular  Rate: normal         Dental    Dentition: Caps/crowns     Pulmonary  Breath sounds clear to auscultation               Abdominal  GI exam deferred       Other Findings            Anesthetic Plan    ASA: 3  Anesthesia type: general          Induction: Intravenous  Anesthetic plan and risks discussed with: Patient and Spouse

## 2017-06-14 NOTE — PERIOP NOTES
TRANSFER - IN REPORT:    Verbal report received from [facility] (name) on Palomo Shrestha  being received from OR (unit) for routine progression of care      Report consisted of patients Situation, Background, Assessment and   Recommendations(SBAR). Information from the following report(s) OR Summary, Procedure Summary, Intake/Output and MAR was reviewed with the receiving nurse. Opportunity for questions and clarification was provided. Assessment completed upon patients arrival to unit and care assumed.

## 2017-06-14 NOTE — ROUTINE PROCESS
TRANSFER - IN REPORT:    Verbal report received from KEIRA Brady RN(name) on Alice Bush  being received from [facility]) for routine post - op      Report consisted of patients Situation, Background, Assessment and   Recommendations(SBAR). Information from the following report(s) SBAR, Kardex, [facility] and Recent Results was reviewed with the receiving nurse. Opportunity for questions and clarification was provided. Assessment completed upon patients arrival to unit and care assumed.

## 2017-06-14 NOTE — NURSE NAVIGATOR
C/O severe pain. 8 on scale of 1-10. Will medicate per orders. Positioned for comfort. Pulse Ox . In place. SaO2 100%.

## 2017-06-15 ENCOUNTER — APPOINTMENT (OUTPATIENT)
Dept: INTERVENTIONAL RADIOLOGY/VASCULAR | Age: 47
DRG: 858 | End: 2017-06-15
Attending: PHYSICIAN ASSISTANT
Payer: MEDICARE

## 2017-06-15 VITALS
HEART RATE: 63 BPM | WEIGHT: 237.19 LBS | HEIGHT: 74 IN | OXYGEN SATURATION: 100 % | SYSTOLIC BLOOD PRESSURE: 145 MMHG | BODY MASS INDEX: 30.44 KG/M2 | DIASTOLIC BLOOD PRESSURE: 95 MMHG | TEMPERATURE: 98.9 F | RESPIRATION RATE: 18 BRPM

## 2017-06-15 LAB
ANION GAP BLD CALC-SCNC: 5 MMOL/L (ref 3–18)
BUN SERPL-MCNC: 9 MG/DL (ref 7–18)
BUN/CREAT SERPL: 8 (ref 12–20)
CALCIUM SERPL-MCNC: 8.2 MG/DL (ref 8.5–10.1)
CHLORIDE SERPL-SCNC: 106 MMOL/L (ref 100–108)
CO2 SERPL-SCNC: 31 MMOL/L (ref 21–32)
CREAT SERPL-MCNC: 1.06 MG/DL (ref 0.6–1.3)
ERYTHROCYTE [DISTWIDTH] IN BLOOD BY AUTOMATED COUNT: 13 % (ref 11.6–14.5)
ERYTHROCYTE [SEDIMENTATION RATE] IN BLOOD: 24 MM/HR (ref 0–15)
GLUCOSE SERPL-MCNC: 109 MG/DL (ref 74–99)
HCT VFR BLD AUTO: 35 % (ref 36–48)
HGB BLD-MCNC: 11.7 G/DL (ref 13–16)
INR PPP: 1 (ref 0.8–1.2)
MCH RBC QN AUTO: 31 PG (ref 24–34)
MCHC RBC AUTO-ENTMCNC: 33.4 G/DL (ref 31–37)
MCV RBC AUTO: 92.6 FL (ref 74–97)
PLATELET # BLD AUTO: 263 K/UL (ref 135–420)
PMV BLD AUTO: 9.7 FL (ref 9.2–11.8)
POTASSIUM SERPL-SCNC: 4.4 MMOL/L (ref 3.5–5.5)
PROTHROMBIN TIME: 12.6 SEC (ref 11.5–15.2)
RBC # BLD AUTO: 3.78 M/UL (ref 4.7–5.5)
SODIUM SERPL-SCNC: 142 MMOL/L (ref 136–145)
WBC # BLD AUTO: 7.4 K/UL (ref 4.6–13.2)

## 2017-06-15 PROCEDURE — 02HV33Z INSERTION OF INFUSION DEVICE INTO SUPERIOR VENA CAVA, PERCUTANEOUS APPROACH: ICD-10-PCS | Performed by: RADIOLOGY

## 2017-06-15 PROCEDURE — 85027 COMPLETE CBC AUTOMATED: CPT | Performed by: PHYSICIAN ASSISTANT

## 2017-06-15 PROCEDURE — 85652 RBC SED RATE AUTOMATED: CPT | Performed by: PHYSICIAN ASSISTANT

## 2017-06-15 PROCEDURE — C1751 CATH, INF, PER/CENT/MIDLINE: HCPCS

## 2017-06-15 PROCEDURE — 86141 C-REACTIVE PROTEIN HS: CPT | Performed by: ORTHOPAEDIC SURGERY

## 2017-06-15 PROCEDURE — 74011250636 HC RX REV CODE- 250/636: Performed by: RADIOLOGY

## 2017-06-15 PROCEDURE — 74011250637 HC RX REV CODE- 250/637: Performed by: PHYSICIAN ASSISTANT

## 2017-06-15 PROCEDURE — 97165 OT EVAL LOW COMPLEX 30 MIN: CPT

## 2017-06-15 PROCEDURE — 74011000250 HC RX REV CODE- 250: Performed by: RADIOLOGY

## 2017-06-15 PROCEDURE — 97161 PT EVAL LOW COMPLEX 20 MIN: CPT

## 2017-06-15 PROCEDURE — 97535 SELF CARE MNGMENT TRAINING: CPT

## 2017-06-15 PROCEDURE — 74011250636 HC RX REV CODE- 250/636: Performed by: PHYSICIAN ASSISTANT

## 2017-06-15 PROCEDURE — 97116 GAIT TRAINING THERAPY: CPT

## 2017-06-15 PROCEDURE — 36415 COLL VENOUS BLD VENIPUNCTURE: CPT | Performed by: PHYSICIAN ASSISTANT

## 2017-06-15 PROCEDURE — 85610 PROTHROMBIN TIME: CPT | Performed by: ORTHOPAEDIC SURGERY

## 2017-06-15 PROCEDURE — 80048 BASIC METABOLIC PNL TOTAL CA: CPT | Performed by: PHYSICIAN ASSISTANT

## 2017-06-15 PROCEDURE — 74011250636 HC RX REV CODE- 250/636: Performed by: ANESTHESIOLOGY

## 2017-06-15 RX ORDER — HEPARIN SODIUM 200 [USP'U]/100ML
500 INJECTION, SOLUTION INTRAVENOUS
Status: COMPLETED | OUTPATIENT
Start: 2017-06-15 | End: 2017-06-15

## 2017-06-15 RX ORDER — HEPARIN SODIUM (PORCINE) LOCK FLUSH IV SOLN 100 UNIT/ML 100 UNIT/ML
500 SOLUTION INTRAVENOUS
Status: COMPLETED | OUTPATIENT
Start: 2017-06-15 | End: 2017-06-15

## 2017-06-15 RX ORDER — OXYCODONE HYDROCHLORIDE 10 MG/1
10-15 TABLET ORAL
Qty: 90 TAB | Refills: 0 | Status: SHIPPED | OUTPATIENT
Start: 2017-06-15

## 2017-06-15 RX ORDER — LIDOCAINE HYDROCHLORIDE 10 MG/ML
1-20 INJECTION INFILTRATION; PERINEURAL
Status: COMPLETED | OUTPATIENT
Start: 2017-06-15 | End: 2017-06-15

## 2017-06-15 RX ADMIN — DIPHENHYDRAMINE HYDROCHLORIDE 25 MG: 25 CAPSULE ORAL at 08:29

## 2017-06-15 RX ADMIN — DIPHENHYDRAMINE HYDROCHLORIDE 25 MG: 25 CAPSULE ORAL at 01:17

## 2017-06-15 RX ADMIN — GABAPENTIN 100 MG: 100 CAPSULE ORAL at 18:29

## 2017-06-15 RX ADMIN — HYDROMORPHONE HYDROCHLORIDE 0.5 MG: 1 INJECTION, SOLUTION INTRAMUSCULAR; INTRAVENOUS; SUBCUTANEOUS at 15:47

## 2017-06-15 RX ADMIN — DEXTROAMPHETAMINE SACCHARATE, AMPHETAMINE ASPARTATE, DEXTROAMPHETAMINE SULFATE AND AMPHETAMINE SULFATE 30 MG: 1.875; 1.875; 1.875; 1.875 TABLET ORAL at 08:29

## 2017-06-15 RX ADMIN — OXYCODONE HYDROCHLORIDE 10 MG: 5 TABLET ORAL at 11:50

## 2017-06-15 RX ADMIN — OXYCODONE HYDROCHLORIDE 15 MG: 5 TABLET ORAL at 17:02

## 2017-06-15 RX ADMIN — DOCUSATE SODIUM 100 MG: 100 CAPSULE, LIQUID FILLED ORAL at 08:29

## 2017-06-15 RX ADMIN — HEPARIN SODIUM (PORCINE) LOCK FLUSH IV SOLN 100 UNIT/ML 500 UNITS: 100 SOLUTION at 14:06

## 2017-06-15 RX ADMIN — VANCOMYCIN HYDROCHLORIDE 1500 MG: 10 INJECTION, POWDER, LYOPHILIZED, FOR SOLUTION INTRAVENOUS at 01:18

## 2017-06-15 RX ADMIN — LIDOCAINE HYDROCHLORIDE 1 ML: 10 INJECTION, SOLUTION INFILTRATION; PERINEURAL at 14:03

## 2017-06-15 RX ADMIN — Medication 10 ML: at 06:54

## 2017-06-15 RX ADMIN — VANCOMYCIN HYDROCHLORIDE 1500 MG: 10 INJECTION, POWDER, LYOPHILIZED, FOR SOLUTION INTRAVENOUS at 15:47

## 2017-06-15 RX ADMIN — CLONAZEPAM 1 MG: 0.5 TABLET ORAL at 03:09

## 2017-06-15 RX ADMIN — TIZANIDINE 4 MG: 4 TABLET ORAL at 09:43

## 2017-06-15 RX ADMIN — PREGABALIN 300 MG: 100 CAPSULE ORAL at 08:29

## 2017-06-15 RX ADMIN — LEVOTHYROXINE SODIUM 75 MCG: 75 TABLET ORAL at 06:54

## 2017-06-15 RX ADMIN — HYDROMORPHONE HYDROCHLORIDE 0.5 MG: 1 INJECTION, SOLUTION INTRAMUSCULAR; INTRAVENOUS; SUBCUTANEOUS at 13:09

## 2017-06-15 RX ADMIN — TAMSULOSIN HYDROCHLORIDE 0.4 MG: 0.4 CAPSULE ORAL at 08:29

## 2017-06-15 RX ADMIN — HEPARIN SODIUM 1000 UNITS: 200 INJECTION, SOLUTION INTRAVENOUS at 14:03

## 2017-06-15 RX ADMIN — Medication 10 ML: at 15:47

## 2017-06-15 RX ADMIN — PANTOPRAZOLE SODIUM 40 MG: 40 TABLET, DELAYED RELEASE ORAL at 08:29

## 2017-06-15 NOTE — PROGRESS NOTES
Met with pt at bedside. Pt plans discharge home with trish able to assist.  [facility] offered and pt chose [phone] for follow up; referral placed with CMS. ID has written rx for Vanco BID, pt on 2 and 1400 schedule, ID does not want pt to skip dose or delay dose for 6hrs for 8am admission. Pt aware he may have possible copay for home iv meds and supplies. Therefore, [facility] working on getting pt seen tonight, likely at [facility] prior to leaving, to hook him to a CADD infusion pump which will allow for the 2 am home dose and [facility] will admit him to services tomorrow. [facility], infusion company, waiting on PICC report before they can proceed to fill order. CM will follow. If unable to coordinate, pt would need to remain until tomorrow morning to receive 2am dose. CM following to finalize. Plan: home with home health for wound care, IV abx with [facility] either tonight or tomorrow morning. Care Management Interventions  PCP Verified by CM:  Yes  Transition of Care Consult (CM Consult): [address]: No  Reason Outside [place]: Physician referred to specific agency ([facility] )  Discharge Durable Medical Equipment: No (pt has DME)  Physical Therapy Consult: Yes  Occupational Therapy Consult: Yes  Current Support Network: Own Home, Lives with Spouse  Confirm Follow Up Transport: Family  Plan discussed with Pt/Family/Caregiver: Yes  Freedom of Choice Offered: Yes  Discharge Location  Discharge Placement: Home with home health

## 2017-06-15 NOTE — PROGRESS NOTES
Page received- pt concerned he won't make it home in time for med delivery, is now receiving IV abx. CM confirmed address of friend [address] where pt would like meds delivered and nurse to see him tonight. CM contacted Jessica Wan with [facility]  to let her know, also spoke with Gulshan Friend at [facility] to let her know address and to give Jt's phone number for  to call for ETA of meds to address. CM instructed pt and pt verbalizes understanding that he will need to remain at above address for nurse to admit. Pt has phone number to [facility] if he needs to contact them; also gave unit manager number of pt cell phone to give to admitting RN when they call to let her know pt leaving unit. No further needs at this time, CM remains available.

## 2017-06-15 NOTE — CONSULTS
Infectious Disease Consult  Full Consult Dictated:[id number]    Reason for consultation: post operative wound infection    Assessment:  1. MRSA Post operative wound infection of the lumbar spine in a 45y/o WM s/p L3-L5 laminectomies on 5/22/17  -patient is s/p L3-L5 laminectomy on 5/22/17  -developed erythema and warmth about a week after surgery  -treated with cipro x 1 week as an outpatient but had persistent symptoms with copious purulent drainage, fevers, and nausea  -had cultures obtained in the office on 6/8 that were positive for MRSA  -admitted on 6/14 for I&D, introperatively found to have break in the fascia but no evidence of deep infection  -currently on vancomycin    2. PCN allergy    3. Multiple medical problems  -include h/o TBI, PTSD, neurogenic bladder    Recommendations:  1. Continue vancomycin, anticipate a 6 week course of therapy for deep infection  2. PICC placement  3. Serial serology  4.  Outpatient orders on chart, stable for d/c from ID standpoint once home health arranged
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
## 2017-06-15 NOTE — PROGRESS NOTES
[facility] will see pt at home tonight to hook him up to IV pump for 2am dose. [facility] plans to deliver supplies between 7 and 8pm to pt home. Unit aware that pt may discharge any time, needing to be home by 7p. Pt aware he was waiting for confirmation of home health services and coordination prior to leaving unit.

## 2017-06-15 NOTE — ROUTINE PROCESS
Bedside and verbal shift change report given to BENNY Cote RN (oncoming nurse) by STEPHANIE Lara RN (offgoing nurse). Report included the following information SBAR, Kardex and MAR.
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
## 2017-06-15 NOTE — PROCEDURES
VASCULAR & INTERVENTIONAL RADIOLOGY    Procedure:  PICC PLACEMENT    Indication:  IV access    Assitant: Pablo Cisse RTR    Anesthetic: local.      Procedure Details: 5 [place]ik double lumen 40 cm long Power PICC placed via right Basilic Vein  approach. Tip of catheter in SVC. Ready for immediate use. Specimen: none. Complications:  None. EBL: minimal    Condition: Stable.       Mariza Bautista MD, MD  [id number] [id number]  Vascular & Interventional Radiology  6/15/2017

## 2017-06-15 NOTE — DISCHARGE INSTRUCTIONS
[facility]  Dr. Mello Starch ANYONE WHO SMOKES. AVOID ALL PRODUCTS THAT CONTAIN NICOTINE. DO NOT TAKE IBUPROFEN OR ANTI--INFLAMMATORIES, AS THESE MAY ALTER THE HEALING OF THE FUSION. ACTIVITIES :  *The first week after surgery   1. You may be up and walking about the house. 2.  Activities around the house, such as washing dishes, fixing light meals, and your own personal care are fine. 3.  Avoid strenuous activities, such as vacuuming, lifting laundry or grocery bags. 4.  Do not lift anything heavier than 1 gallon of milk (or about 5-8 pounds). 5.  Do not bend over to  items from the ground level until 3 months post-op. *Week 2 and beyond  1. You may gradually increase your activities, but still avoid heavy lifting, pushing/pulling. 2.  Walking is the best way to rebuild strength and stamina. Start SLOWLY and gradually increase the distance a little every week. 3.  Walk at a pace that avoids fatigue or severe pain. Do not try to walk several blocks the first day! As you increase the distance, you may feel tired. If so, stop and rest.   4.  You should be able to walk several blocks by your first clinic visit. 5.  Follow-up with Dr. Mike Davis in 10-14 days. BATHING and INCISION CARE:  1. The incision may be tender to touch or feel numb: this is normal.   2.  Keep the incision clean and dry. Do not get incision wet for 5 days. The incision will be closed with sutures under the skin and the skin will be glued. 3.  Do not apply any lotions, ointments or oils on the incision. 4.  If you notice any excessive swelling, redness, or persistent drainage around the incision, notify the office immediately. DRIVIN. You should not drive until after your follow-up appointment. 2.  You can be in a vehicle for short distances, but if you travel any long distance, please stop about every 30 minutes and walk/stretch. 3.  You should NEVER drive while taking narcotic medication. RETURN TO WORK :  1. The decision to return to work will be determined on an individual basis. 2.  Many people who have a strenuous job (construction, heavy labor, etc) may need to be off work for up to 12 weeks. 3.  If you need a work note, please let us know as soon as possible, and not the same day you are planning to return to work. NUTRITION :  1.  Good nutrition is an essential part of healing. 2.  You should eat a balanced diet each day, including fruits, vegetables, dairy products and protein. 3.  Remember to drink plenty of water. 4.  If you have not had a bowel movement within 3 days of surgery, you will need to use a laxative or suppository that can be obtained over-the-counter at your local pharmacy. BONE STIMULATOR:  1. A spinal bone stimulator may be prescribed for you under certain situations. 2.  A nurse will call you if one has been requested and discuss its use for approximately 4-6 months post-op every day. 3.  This device assists in bone healing and fusion. MEDICATIONS -  1. You may resume the medications you were taking before surgery, with the general exception of (or DO NOT TAKE) non-steroidal anti-inflammatory medications, such as Motrin, Aleve, Advil Naprosyn, Ibuprofen or aspirin. 2.  You will receive a prescription for pain medication at discharge from the hospital. The pain medication works best if taken before the pain becomes severe. 3.  To reduce stomach upset, always take the medication with food. 4.  Begin to wean yourself off the pain medication during the second week after discharge. 5.  If you need a refill, please call the office during working hours at least 2 days before your prescription runs out. Do not wait until your bottle is empty to call for a refill. 6.  DO NOT drive if you are taking narcotic pain medications.     HOME HEALTH CARE:  1.   A home health care service has been set-up for you to help assist you once you leave the hospital.  2.  They will contact you either before you leave the hospital or within 24 hours once you have been discharged home. 3. A nurse will assist you with your dressing changes and a Physical Therapist with help you with your therapy needs. CALL THE OFFICE:   If you have severe pain unrelieved by the medications, new numbness or tingling in your legs;   If you have a fever of 101.0°F or greater;    If you notice excessive swelling, redness, or persistent drainage from the incision or IV site; [facility] office number is [phone] from 8:00am to 5:00pm Monday through Friday. After 5:00pm, on weekends, or holidays, please leave a message with our answering service and the doctor on-call will get back to you shortly.

## 2017-06-15 NOTE — PROGRESS NOTES
Patient was visited by [facility]. Volunteer followed up with patient and/or family and reported no needs to this . Chaplains will continue to follow and will provide pastoral care as needed or requested. Rev.  [facility]  [phone]

## 2017-06-15 NOTE — CONSULTS
[address] [place] [address] Rd    Name:  Zeina Caraballo  MR#:  [id number]  :  1970  Account #:  [de-identified]  Date of Adm:  2017  Date of Consultation:  06/15/2017      REFERRING PHYSICIAN: Dr. Rina Mccullough: Postoperative wound infection. ASSESSMENT  1. Methicillin-resistant Staphylococcus aureus postoperative wound  infection of the lumbar spine in a 64-year-old white male status post  L3-L5 laminectomies on 2017. The patient is status post above  surgery on 2017. Approximately 1 week after surgery,  he developed erythema and warmth to the area. He was treated with  ciprofloxacin x1 week as an outpatient, but had persistent symptoms  with copious purulent drainage, fevers and nausea. He had cultures  obtained in the office on the  that were positive for  methicillin-resistant Staph aureus. The patient was admitted on  2017 for an incision and drainage, and intraoperatively found to  have a break in the fascia, but no evidence of deep infection. He is  currently on vancomycin. 2. PENICILLIN ALLERGY. 3. Multiple medical problems including a history of traumatic brain  injury, posttraumatic stress disorder, neurogenic bladder. RECOMMENDATIONS  1. Continue vancomycin, anticipate a 6-week course of therapy for  deep infection. 2. PICC line placement. 3. Serial serology. 4. Outpatient orders are in the chart and the patient is stable for  discharge from ID standpoint once his home health is arranged. I will  be checking a vancomycin trough on Saturday to ensure proper  dosing. Thank you for allowing us to participate in the care of your patient. We  will continue to follow with you.     HISTORY OF PRESENT ILLNESS: The patient is a very pleasant 40  year-old white male with past medical history significant for traumatic  brain injury, PTSD, and severe degenerative disk disease, who  underwent L3-L5 laminectomy on 05/22/2017. He states approximately  1 week after going home it was noted that the wound was  becoming [place] looking. \" Apparently, he was supposed to have home  health for wound care, but they never arrived. He states that his  fiancee was hoping to change the dressing on the wound and try to  keep it clean. They were at times using iodine to the area as well as  hydrogen peroxide as recommended by the nurses at Dr. Bebeto Brown  office. He states that despite these interventions, he had increased  swelling and pain in the area. He then developed oozing overnight. He  was given a course of ciprofloxacin dosed apparently at 500 mg t.i.d.  x7 days, but states that despite this antibiotic he continued to have  progression of his symptoms including increased purulent drainage  from the area. He also noted fevers to 101 degrees Fahrenheit  associated with nausea and vomiting. He states that he did have some  cultures obtained in the office on 06/08/2017, which returned positive  for methicillin-resistant Staph aureus. Then, the patient began to lose  some feeling in his left leg and given concern for this, he called back to  the office who informed him that he should go to the hospital on the  14th for surgery. The patient was brought to the operating room  yesterday. He underwent an I and D of the lumbar wound drain. Intraoperatively, he was noted to have a pocket of pus subcutaneously  that tracked down to above the fascia. There was also a break in the  fascia throughout the wound itself, but did not go down to the  laminectomy site. There was also some granulomatous tissue that was  removed. The patient's fascia was closed and his subcutaneous skin  was approximated. It does not appear that any intraoperative cultures  were taken given the prior outpatient culture that was positive for  methicillin-resistant Staph aureus.  The patient was started on  vancomycin and ID was consulted for assistance with antibiotics. The patient currently states he is feeling well. He denies any current  fevers or chills. He denies any headache or neck stiffness. He denies  any cough, shortness of breath or sputum production. He denies any  chest pain or palpitations. He denies any nausea, vomiting, diarrhea or  abdominal pain. He does have some pain in his back as expected;  however, he does feel that the sensation in his leg has come back  somewhat. He denies any other complaints. The patient does have a  history of neurogenic bladder, but states that he does not need to self-  catheterize himself. PAST MEDICAL HISTORY  1. Traumatic brain injury. 2. Posttraumatic stress disorder. 3. Depression. 4. Migraine headaches. 5. Spinal stenosis. PAST SURGICAL HISTORY: Fairly extensive and includes  2 decompressions/fusion of C3-C7, decompression/fusion of T10-  T12, gastric sleeve, ACL repair, left ankle reconstruction, and his L3-L5  laminectomy. SOCIAL HISTORY: The patient denies any tobacco use. He denies  any heavy alcohol use, drinks approximately a 6-pack of beer per  week. He denies any illicit drug use. He is medically retired from [employer] and 100% disabled. FAMILY HISTORY: Without any diabetes, heart disease, or stroke. ALLERGIES  INCLUDE:  1. PROZAC. 2. AUGMENTIN. 3. GEODON. 4. MORPHINE. 5. QUETIAPINE. 6. TRAZODONE. 7. VICODIN. REVIEW OF SYSTEMS: A 12-point review of systems was obtained  and negative except as in the HPI. PHYSICAL EXAMINATION  VITAL SIGNS: Show a temperature of 98.9, pulse is 63, respirations of  18, blood pressure 145/95. He is saturating at 100% on room air. GENERAL: He is a well-developed, obese white male. He is awake,  alert and oriented x3, in no acute distress. HEENT: Extraocular muscles are intact. Pupils equally round and  reactive to light. Normocephalic, atraumatic. Mucous membranes are  moist. Sclerae nonicteric. Oropharynx is clear, without thrush.   NECK: Supple. There was no lymphadenopathy. HEART: Regular rate and rhythm without murmurs, rubs or gallops. LUNGS: Clear to auscultation bilaterally without rhonchi, rales, or  wheezes. ABDOMEN: Soft, obese, nontender, nondistended with normoactive  bowel sounds. BACK: He had a midline incision in the lumbar spine that was sutured  in place. There was no surrounding erythema or warmth and no  purulent drainage was noted on the bandage. EXTREMITIES: Without edema. SKIN: Without rashes. PSYCHIATRIC: He had normal mood and affect with intact recent and  remote memory. NEUROLOGIC: Cranial nerves 2 through 12 grossly intact. ANCILLARY DATA: His white blood cell count was 7.4, hemoglobin of  11.7, hematocrit of 35 with platelets of 805. ESR was 24. CRP of 4.0. Sodium 142, potassium 4.4, chloride of 106, CO2 of 31, BUN of 9,  creatinine of 1.06 with a glucose of 109. INR was 1. Culture data from  June 8th in the office was positive for methicillin-resistant Staph  aureus, which was noted to be resistant to clindamycin, but susceptible  to Bactrim, vancomycin, tetracycline, and Rifampin. RADIOLOGY DATA: None.         DO TODD Doty / KAYLAH  D:  06/15/2017   11:43  T:  06/15/2017   12:22  Job #:  [id number]

## 2017-06-15 NOTE — PROGRESS NOTES
Progress Note POD #1      Patient: Leonela Erickson               Sex: male          DOA: 6/14/2017         YOB: 1970      Surgery: Procedure(s):  INCISION AND DRAINAGE OF LUMBAR WOUND INFECTION           LOS: 1 day               Subjective:     No new complaints. Feeling better. Objective:      Visit Vitals    /84    Pulse 63    Temp 98.6 °F (37 °C)    Resp 18    Ht 6' 2\" (1.88 m)    Wt 107.6 kg (237 lb 3 oz)    SpO2 99%    BMI 30.45 kg/m2       Physical Exam:  Neurological: motor strength: 5/5 in lower extremities bilaterally                          sensation: intact to light touch  Patient mobility                         Intake and Output:  Current Shift:     Last three shifts:  06/13 1901 - 06/15 0700  In: 3916 [P.O.:500; I.V.:3416]  Out: 12 [Urine:2850; Drains:20]    Lab/Data Reviewed:    Lab/Data Reviewed:  Lab Results   Component Value Date/Time    WBC 7.4 06/15/2017 02:15 AM    HGB 11.7 06/15/2017 02:15 AM    HCT 35.0 06/15/2017 02:15 AM    PLATELET 843 50/64/8482 02:15 AM    MCV 92.6 06/15/2017 02:15 AM     Lab Results   Component Value Date/Time    aPTT 27.1 07/13/2012 11:06 AM     Lab Results   Component Value Date/Time    INR 1.0 06/15/2017 02:15 AM    INR 0.8 07/13/2012 11:06 AM    Prothrombin time 12.6 06/15/2017 02:15 AM    Prothrombin time 11.5 07/13/2012 11:06 AM      Sed rate 24. CRP 4.0      Assessment/Plan     Principal Problem:    Lumbar surgical wound fluid collection (6/13/2017)    Active Problems:    MRSA (methicillin resistant Staphylococcus aureus) infection (6/13/2017)        1. Stable  2. OOB with PT  3. D/C Planning  4. D/C PCA  5. Self cath prn.  6. MRSA lumbar wound per wound cx. Afebrile. WBC-nml. Sed rate 24, down from 32 yesterday. 7. D/C home after PICC line placement & ID consult. 8. Mepilex dressing changes. 9. Continue Vanco for now. 10. PICC line today.

## 2017-06-15 NOTE — PROGRESS NOTES
1630 - Discharge instructions reviewed with patient at this time. Opportunity for questions and clarification was provided. Patient has verbalized understanding. Patient was provided with care notes to include side effects of RX's. Arm bands removed and shredded. AVS reviewed with ***. IV removed. Dressing ***.

## 2017-06-16 ENCOUNTER — HOSPITAL ENCOUNTER (EMERGENCY)
Age: 47
Discharge: HOME | End: 2017-06-17
Attending: EMERGENCY MEDICINE
Payer: MEDICARE

## 2017-06-16 DIAGNOSIS — G89.18 POSTOPERATIVE PAIN: ICD-10-CM

## 2017-06-16 LAB
ALBUMIN SERPL BCP-MCNC: 3.2 G/DL (ref 3.4–5)
ALBUMIN/GLOB SERPL: 0.7 {RATIO} (ref 0.8–1.7)
ALP SERPL-CCNC: 79 U/L (ref 45–117)
ALT SERPL-CCNC: 16 U/L (ref 16–61)
ANION GAP BLD CALC-SCNC: 10 MMOL/L (ref 3–18)
AST SERPL W P-5'-P-CCNC: 22 U/L (ref 15–37)
BASOPHILS # BLD AUTO: 0 K/UL (ref 0–0.06)
BASOPHILS # BLD: 0 % (ref 0–2)
BILIRUB SERPL-MCNC: 0.7 MG/DL (ref 0.2–1)
BUN SERPL-MCNC: 6 MG/DL (ref 7–18)
BUN/CREAT SERPL: 6 (ref 12–20)
CALCIUM SERPL-MCNC: 9.2 MG/DL (ref 8.5–10.1)
CHLORIDE SERPL-SCNC: 100 MMOL/L (ref 100–108)
CK MB CFR SERPL CALC: NORMAL % (ref 0–4)
CK MB SERPL-MCNC: <1 NG/ML (ref 5–25)
CK SERPL-CCNC: 59 U/L (ref 39–308)
CO2 SERPL-SCNC: 30 MMOL/L (ref 21–32)
CREAT SERPL-MCNC: 0.96 MG/DL (ref 0.6–1.3)
CRP SERPL HS-MCNC: 60.25 MG/L (ref 0–3)
DIFFERENTIAL METHOD BLD: ABNORMAL
EOSINOPHIL # BLD: 0.5 K/UL (ref 0–0.4)
EOSINOPHIL NFR BLD: 5 % (ref 0–5)
ERYTHROCYTE [DISTWIDTH] IN BLOOD BY AUTOMATED COUNT: 12.7 % (ref 11.6–14.5)
GLOBULIN SER CALC-MCNC: 4.3 G/DL (ref 2–4)
GLUCOSE SERPL-MCNC: 102 MG/DL (ref 74–99)
HCT VFR BLD AUTO: 41 % (ref 36–48)
HGB BLD-MCNC: 14.1 G/DL (ref 13–16)
LACTATE SERPL-SCNC: 0.7 MMOL/L (ref 0.4–2)
LYMPHOCYTES # BLD AUTO: 17 % (ref 21–52)
LYMPHOCYTES # BLD: 1.6 K/UL (ref 0.9–3.6)
MCH RBC QN AUTO: 31 PG (ref 24–34)
MCHC RBC AUTO-ENTMCNC: 34.4 G/DL (ref 31–37)
MCV RBC AUTO: 90.1 FL (ref 74–97)
MONOCYTES # BLD: 0.5 K/UL (ref 0.05–1.2)
MONOCYTES NFR BLD AUTO: 6 % (ref 3–10)
NEUTS SEG # BLD: 6.5 K/UL (ref 1.8–8)
NEUTS SEG NFR BLD AUTO: 72 % (ref 40–73)
PLATELET # BLD AUTO: 277 K/UL (ref 135–420)
PMV BLD AUTO: 9.7 FL (ref 9.2–11.8)
POTASSIUM SERPL-SCNC: 3.6 MMOL/L (ref 3.5–5.5)
PROT SERPL-MCNC: 7.5 G/DL (ref 6.4–8.2)
RBC # BLD AUTO: 4.55 M/UL (ref 4.7–5.5)
SODIUM SERPL-SCNC: 140 MMOL/L (ref 136–145)
TROPONIN I SERPL-MCNC: 0.02 NG/ML (ref 0–0.06)
WBC # BLD AUTO: 9.1 K/UL (ref 4.6–13.2)

## 2017-06-16 PROCEDURE — 96365 THER/PROPH/DIAG IV INF INIT: CPT

## 2017-06-16 PROCEDURE — 82550 ASSAY OF CK (CPK): CPT | Performed by: PHYSICIAN ASSISTANT

## 2017-06-16 PROCEDURE — 87040 BLOOD CULTURE FOR BACTERIA: CPT | Performed by: EMERGENCY MEDICINE

## 2017-06-16 PROCEDURE — 96366 THER/PROPH/DIAG IV INF ADDON: CPT

## 2017-06-16 PROCEDURE — 74011250636 HC RX REV CODE- 250/636: Performed by: EMERGENCY MEDICINE

## 2017-06-16 PROCEDURE — 83605 ASSAY OF LACTIC ACID: CPT | Performed by: EMERGENCY MEDICINE

## 2017-06-16 PROCEDURE — 96375 TX/PRO/DX INJ NEW DRUG ADDON: CPT

## 2017-06-16 PROCEDURE — 74011000250 HC RX REV CODE- 250: Performed by: EMERGENCY MEDICINE

## 2017-06-16 PROCEDURE — 96376 TX/PRO/DX INJ SAME DRUG ADON: CPT

## 2017-06-16 PROCEDURE — 85025 COMPLETE CBC W/AUTO DIFF WBC: CPT | Performed by: PHYSICIAN ASSISTANT

## 2017-06-16 PROCEDURE — 80053 COMPREHEN METABOLIC PANEL: CPT | Performed by: PHYSICIAN ASSISTANT

## 2017-06-16 PROCEDURE — 99283 EMERGENCY DEPT VISIT LOW MDM: CPT

## 2017-06-16 PROCEDURE — 93005 ELECTROCARDIOGRAM TRACING: CPT

## 2017-06-16 RX ORDER — HYDROMORPHONE HYDROCHLORIDE 1 MG/ML
1 INJECTION, SOLUTION INTRAMUSCULAR; INTRAVENOUS; SUBCUTANEOUS ONCE
Status: COMPLETED | OUTPATIENT
Start: 2017-06-16 | End: 2017-06-16

## 2017-06-16 RX ORDER — ONDANSETRON 2 MG/ML
4 INJECTION INTRAMUSCULAR; INTRAVENOUS
Status: DISCONTINUED | OUTPATIENT
Start: 2017-06-16 | End: 2017-06-16

## 2017-06-16 RX ORDER — ONDANSETRON 2 MG/ML
4 INJECTION INTRAMUSCULAR; INTRAVENOUS
Status: COMPLETED | OUTPATIENT
Start: 2017-06-16 | End: 2017-06-16

## 2017-06-16 RX ORDER — HYDROMORPHONE HYDROCHLORIDE 4 MG/1
4 TABLET ORAL
COMMUNITY

## 2017-06-16 RX ADMIN — SODIUM CHLORIDE 1000 MG: 900 INJECTION, SOLUTION INTRAVENOUS at 22:34

## 2017-06-16 RX ADMIN — HYDROMORPHONE HYDROCHLORIDE 1 MG: 1 INJECTION, SOLUTION INTRAMUSCULAR; INTRAVENOUS; SUBCUTANEOUS at 22:00

## 2017-06-16 RX ADMIN — ONDANSETRON 4 MG: 2 INJECTION INTRAMUSCULAR; INTRAVENOUS at 22:00

## 2017-06-16 RX ADMIN — HYDROMORPHONE HYDROCHLORIDE 1 MG: 1 INJECTION, SOLUTION INTRAMUSCULAR; INTRAVENOUS; SUBCUTANEOUS at 23:04

## 2017-06-16 RX ADMIN — WATER 2 MG: 1 INJECTION INTRAMUSCULAR; INTRAVENOUS; SUBCUTANEOUS at 22:34

## 2017-06-16 NOTE — ED TRIAGE NOTES
Amb into ed w/ reports he has a known MRSA infection s/p sgy L3-L5 performed here by Dr Nuzhat Meredith on 5/22/17 - had emergency sgy Wednesday for irrigation - d/c'ed home last pm - w/o home health to perform IV antibiotics - pt has PICC in RUE - pt reports increased swelling in back area w/ oozing through his bandages and increased pain 9/10 now. Pt reports feeling feverish today and wound very red looking in appearance.

## 2017-06-17 VITALS
DIASTOLIC BLOOD PRESSURE: 95 MMHG | BODY MASS INDEX: 31.42 KG/M2 | WEIGHT: 232 LBS | OXYGEN SATURATION: 99 % | RESPIRATION RATE: 18 BRPM | SYSTOLIC BLOOD PRESSURE: 144 MMHG | HEART RATE: 88 BPM | TEMPERATURE: 98.5 F | HEIGHT: 72 IN

## 2017-06-17 NOTE — ED NOTES
Patient armband removed and shredded I have reviewed discharge instructions with the patient and spouse. The patient and spouse verbalized understanding. No prescriptions given. Ambulates with steady gait to front lobby with visitor to drive home. States is feeling much better. Home health nurse to go to patient's house today. Purple lumen able to flush and pull blood. Unable to flush red lumen at time of discharge.

## 2017-06-17 NOTE — ED NOTES
Bacitracin and clean mepilex applied to wound. No drainage or redness noted to surgical site, edges well approximated.

## 2017-06-17 NOTE — DISCHARGE INSTRUCTIONS
Acute Pain After Surgery: Care Instructions  Your Care Instructions  It's common to have some pain after surgery. Pain doesn't mean that something is wrong or that the surgery didn't go well. But when the pain is severe, it's important to work with your doctor to manage it. It's also important to be aware of a few facts about pain and pain medicine. · You are the only person who knows what your pain feels like. So be sure to tell your doctor when you are in pain or when the pain changes. Then he or she will know how to adjust your medicines. · Pain is often easier to control right after it starts. So it may be better to take regular doses of pain medicine and not wait until the pain gets bad. · Medicine can help control pain. But this doesn't mean you'll have no pain. Medicine works to keep the pain at a level you can live with. With time, you will feel better. Follow-up care is a key part of your treatment and safety. Be sure to make and go to all appointments, and call your doctor if you are having problems. It's also a good idea to know your test results and keep a list of the medicines you take. How can you care for yourself at home? · Be safe with medicines. Read and follow all instructions on the label. ¨ If the doctor gave you a prescription medicine for pain, take it as prescribed. ¨ If you are not taking a prescription pain medicine, ask your doctor if you can take an over-the-counter medicine. · If you take an over-the-counter pain medicine, such as acetaminophen (Tylenol), ibuprofen (Advil, Motrin), or naproxen (Aleve), read and follow all instructions on the label. · Do not take two or more pain medicines at the same time unless the doctor told you to. · Do not drink alcohol while you are taking pain medicines. · Try to walk each day if your doctor recommends it. Start by walking a little more than you did the day before. Bit by bit, increase the amount you walk.  Walking increases blood flow. It also helps prevent pneumonia and constipation. · To prevent constipation from opioid pain medicines:  ¨ Talk to your doctor about a laxative. ¨ Include fruits, vegetables, beans, and whole grains in your diet each day. These foods are high in fiber. ¨ Drink plenty of fluids, enough so that your urine is light yellow or clear like water. Drink water, fruit juice, or other drinks that do not contain caffeine or alcohol. If you have kidney, heart, or liver disease and have to limit fluids, talk with your doctor before you increase the amount of fluids you drink. ¨ Take a fiber supplement, such as Citrucel or Metamucil, every day if needed. Read and follow all instructions on the label. If you take pain medicine for more than a few days, talk to your doctor before you take fiber. When should you call for help? Call your doctor now or seek immediate medical care if:  · Your pain gets worse. · Your pain is not controlled by medicine. Watch closely for changes in your health, and be sure to contact your doctor if you have any problems. Where can you learn more? Go to http://devon-hallie.info/. Enter (23) 411-580 in the search box to learn more about \"Acute Pain After Surgery: Care Instructions. \"  Current as of: November 15, 2016  Content Version: 11.2  © 1347-5928 [employer]. Care instructions adapted under license by "[employer] (which disclaims liability or warranty for this information). If you have questions about a medical condition or this instruction, always ask your healthcare professional. [place] [address] any warranty or liability for your use of this information. Infection After Surgery: Care Instructions  Your Care Instructions  After surgery, an infection is always possible. It doesn't mean that the surgery didn't go well. Because an infection can be serious, your doctor has taken steps to manage it.   Your doctor checked the infection and cleaned it if necessary. He or she may have made an opening in the area so that the pus can drain out. You may have gauze in the cut so that the area will stay open and keep draining. You may need antibiotics. You will need to follow up with your doctor to make sure the infection has gone away. Follow-up care is a key part of your treatment and safety. Be sure to make and go to all appointments, and call your doctor if you are having problems. It's also a good idea to know your test results and keep a list of the medicines you take. How can you care for yourself at home? · Make sure your surgeon knows that you saw a doctor about the infection. · If your doctor prescribed antibiotics, take them as directed. Do not stop taking them just because you feel better. You need to take the full course of antibiotics. · Ask your doctor if you can take an over-the-counter pain medicine, such as acetaminophen (Tylenol), ibuprofen (Advil, Motrin), or naproxen (Aleve). Be safe with medicines. Read and follow all instructions on the label. · Do not take two or more pain medicines at the same time unless the doctor told you to. Many pain medicines have acetaminophen, which is Tylenol. Too much acetaminophen (Tylenol) can be harmful. · Prop up the area on a pillow anytime you sit or lie down during the next 3 days. Try to keep it above the level of your heart. This will help reduce swelling. · Keep the skin clean and dry. · If you have a bandage, keep it clean and dry. · You may have a dressing over the cut (incision). A dressing helps the incision heal and protects it. Your doctor will tell you how to take care of this. You can expect drainage from the wound. · If your doctor told you how to care for your incision, follow your doctor's instructions. If you did not get instructions, follow this general advice:  ¨ Wash around the incision with clean water 2 times a day.  Don't use hydrogen peroxide or alcohol, which can slow healing. When should you call for help? Call your doctor now or seek immediate medical care if:  · You have signs that your infection is getting worse, such as:  ¨ Increased pain, swelling, warmth, or redness in the area. ¨ Red streaks leading from the area. ¨ Pus draining from the wound. ¨ A new or higher fever. Watch closely for changes in your health, and be sure to contact your doctor if you have any problems. Where can you learn more? Go to http://devon-hallie.info/. Enter C340 in the search box to learn more about \"Infection After Surgery: Care Instructions. \"  Current as of: May 27, 2016  Content Version: 11.2  © 8873-1599 [employer]. Care instructions adapted under license by [employer] (which disclaims liability or warranty for this information). If you have questions about a medical condition or this instruction, always ask your healthcare professional. [address] any warranty or liability for your use of this information.
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
## 2017-06-19 LAB
ATRIAL RATE: 97 BPM
CALCULATED P AXIS, ECG09: 52 DEGREES
CALCULATED R AXIS, ECG10: -31 DEGREES
CALCULATED T AXIS, ECG11: 25 DEGREES
DIAGNOSIS, 93000: NORMAL
P-R INTERVAL, ECG05: 154 MS
Q-T INTERVAL, ECG07: 356 MS
QRS DURATION, ECG06: 86 MS
QTC CALCULATION (BEZET), ECG08: 452 MS
VENTRICULAR RATE, ECG03: 97 BPM

## 2017-06-22 LAB
BACTERIA SPEC CULT: NORMAL
SERVICE CMNT-IMP: NORMAL

## 2018-10-08 ENCOUNTER — DOCUMENTATION ONLY (OUTPATIENT)
Dept: SURGERY | Age: 48
End: 2018-10-08

## 2018-10-08 NOTE — PROGRESS NOTES
Per [facility] requirements;  E-mail and letter sent for follow up appointment. Juanita Joshi Lyle Loss [place]  [facility]  [facility]ly [facility]      Dear Patient,    Your health is our main concern. It is important for your health to have follow-up lab work and to see you surgeon at 2 months, 4 months, 6 months, 9 months and annually after your weight loss surgery. Additionally, the Department of Bariatric Surgery at our hospital is a member of the [employer] [address] Surgical Quality Improvement Program ([facility] NSQIP). As a participant in this program, we gather information on the outcomes of our patients after surgery. Please call the office for a follow up appointment at [phone]. If you have moved out of the area or have changed surgeons please call us and let us know the name of your doctor. Your health and feedback are important to us. We greatly appreciate your response.        Thank you,  Juanita Joshi Lyle Loss [address] [facility]

## 2018-10-08 NOTE — LETTER
[facility]co A [facility] [place] [facility] 
 
 
Dear Patient, Your health is our main concern. It is important for your health to have follow-up lab work and to see you surgeon at 2 months, 4 months, 6 months, 9 months and annually after your weight loss surgery. Additionally, the Department of Bariatric Surgery at our hospital is a member of the [employer] [address] Surgical Quality Improvement Program ([facility] NSQIP). As a participant in this program, we gather information on the outcomes of our patients after surgery. Please call the office for a follow up appointment at [phone]. If you have moved out of the area or have changed surgeons please call us and let us know the name of your doctor. Your health and feedback are important to us. We greatly appreciate your response. Thank you, Marco A Joshi Craig Loss [address]

## 2019-07-22 ENCOUNTER — APPOINTMENT (OUTPATIENT)
Dept: GENERAL RADIOLOGY | Age: 49
End: 2019-07-22
Attending: PHYSICIAN ASSISTANT
Payer: MEDICARE

## 2019-07-22 ENCOUNTER — HOSPITAL ENCOUNTER (EMERGENCY)
Age: 49
Discharge: HOME | End: 2019-07-22
Attending: EMERGENCY MEDICINE
Payer: MEDICARE

## 2019-07-22 VITALS
HEIGHT: 72 IN | TEMPERATURE: 99 F | OXYGEN SATURATION: 100 % | SYSTOLIC BLOOD PRESSURE: 140 MMHG | WEIGHT: 176 LBS | RESPIRATION RATE: 15 BRPM | DIASTOLIC BLOOD PRESSURE: 79 MMHG | BODY MASS INDEX: 23.84 KG/M2 | HEART RATE: 82 BPM

## 2019-07-22 DIAGNOSIS — M54.6 ACUTE BILATERAL THORACIC BACK PAIN: ICD-10-CM

## 2019-07-22 DIAGNOSIS — S13.9XXA CERVICAL SPRAIN, INITIAL ENCOUNTER: Primary | ICD-10-CM

## 2019-07-22 LAB
ANION GAP SERPL CALC-SCNC: 4 MMOL/L (ref 3–18)
ATRIAL RATE: 67 BPM
BASOPHILS # BLD: 0 K/UL (ref 0–0.1)
BASOPHILS NFR BLD: 0 % (ref 0–2)
BUN SERPL-MCNC: 21 MG/DL (ref 7–18)
BUN/CREAT SERPL: 24 (ref 12–20)
CALCIUM SERPL-MCNC: 8.5 MG/DL (ref 8.5–10.1)
CALCULATED P AXIS, ECG09: 43 DEGREES
CALCULATED R AXIS, ECG10: 8 DEGREES
CALCULATED T AXIS, ECG11: 54 DEGREES
CHLORIDE SERPL-SCNC: 103 MMOL/L (ref 100–111)
CK MB CFR SERPL CALC: 1.5 % (ref 0–4)
CK MB SERPL-MCNC: 2.1 NG/ML (ref 5–25)
CK SERPL-CCNC: 140 U/L (ref 39–308)
CO2 SERPL-SCNC: 33 MMOL/L (ref 21–32)
CREAT SERPL-MCNC: 0.89 MG/DL (ref 0.6–1.3)
DIAGNOSIS, 93000: NORMAL
DIFFERENTIAL METHOD BLD: ABNORMAL
EOSINOPHIL # BLD: 0.1 K/UL (ref 0–0.4)
EOSINOPHIL NFR BLD: 2 % (ref 0–5)
ERYTHROCYTE [DISTWIDTH] IN BLOOD BY AUTOMATED COUNT: 13 % (ref 11.6–14.5)
GLUCOSE SERPL-MCNC: 78 MG/DL (ref 74–99)
HCT VFR BLD AUTO: 41.5 % (ref 36–48)
HGB BLD-MCNC: 13.9 G/DL (ref 13–16)
LYMPHOCYTES # BLD: 1.5 K/UL (ref 0.9–3.6)
LYMPHOCYTES NFR BLD: 24 % (ref 21–52)
MCH RBC QN AUTO: 30.1 PG (ref 24–34)
MCHC RBC AUTO-ENTMCNC: 33.5 G/DL (ref 31–37)
MCV RBC AUTO: 89.8 FL (ref 74–97)
MONOCYTES # BLD: 0.5 K/UL (ref 0.05–1.2)
MONOCYTES NFR BLD: 8 % (ref 3–10)
NEUTS SEG # BLD: 4.3 K/UL (ref 1.8–8)
NEUTS SEG NFR BLD: 66 % (ref 40–73)
P-R INTERVAL, ECG05: 150 MS
PLATELET # BLD AUTO: 203 K/UL (ref 135–420)
PMV BLD AUTO: 9.5 FL (ref 9.2–11.8)
POTASSIUM SERPL-SCNC: 4.1 MMOL/L (ref 3.5–5.5)
Q-T INTERVAL, ECG07: 390 MS
QRS DURATION, ECG06: 96 MS
QTC CALCULATION (BEZET), ECG08: 412 MS
RBC # BLD AUTO: 4.62 M/UL (ref 4.7–5.5)
SODIUM SERPL-SCNC: 140 MMOL/L (ref 136–145)
TROPONIN I SERPL-MCNC: <0.02 NG/ML (ref 0–0.04)
VENTRICULAR RATE, ECG03: 67 BPM
WBC # BLD AUTO: 6.3 K/UL (ref 4.6–13.2)

## 2019-07-22 PROCEDURE — 99285 EMERGENCY DEPT VISIT HI MDM: CPT

## 2019-07-22 PROCEDURE — 72072 X-RAY EXAM THORAC SPINE 3VWS: CPT

## 2019-07-22 PROCEDURE — 74011250637 HC RX REV CODE- 250/637: Performed by: PHYSICIAN ASSISTANT

## 2019-07-22 PROCEDURE — 71045 X-RAY EXAM CHEST 1 VIEW: CPT

## 2019-07-22 PROCEDURE — 72050 X-RAY EXAM NECK SPINE 4/5VWS: CPT

## 2019-07-22 PROCEDURE — 93005 ELECTROCARDIOGRAM TRACING: CPT

## 2019-07-22 PROCEDURE — 80048 BASIC METABOLIC PNL TOTAL CA: CPT

## 2019-07-22 PROCEDURE — 74011250636 HC RX REV CODE- 250/636: Performed by: PHYSICIAN ASSISTANT

## 2019-07-22 PROCEDURE — 72074 X-RAY EXAM THORAC SPINE4/>VW: CPT

## 2019-07-22 PROCEDURE — 96374 THER/PROPH/DIAG INJ IV PUSH: CPT

## 2019-07-22 PROCEDURE — 85025 COMPLETE CBC W/AUTO DIFF WBC: CPT

## 2019-07-22 PROCEDURE — 82550 ASSAY OF CK (CPK): CPT

## 2019-07-22 RX ORDER — KETOROLAC TROMETHAMINE 30 MG/ML
30 INJECTION, SOLUTION INTRAMUSCULAR; INTRAVENOUS
Status: COMPLETED | OUTPATIENT
Start: 2019-07-22 | End: 2019-07-22

## 2019-07-22 RX ORDER — METHOCARBAMOL 500 MG/1
500 TABLET, FILM COATED ORAL
Status: COMPLETED | OUTPATIENT
Start: 2019-07-22 | End: 2019-07-22

## 2019-07-22 RX ADMIN — KETOROLAC TROMETHAMINE 30 MG: 30 INJECTION, SOLUTION INTRAMUSCULAR at 17:51

## 2019-07-22 RX ADMIN — METHOCARBAMOL TABLETS 500 MG: 500 TABLET, COATED ORAL at 17:43

## 2019-07-22 NOTE — ED NOTES
Pt laughing and joking with family member at bedside. Pt reports taking daily dose of gabapentin and methadone for chronic pain.

## 2019-07-22 NOTE — ED NOTES
I have reviewed discharge instructions with the patient. The patient verbalized understanding. Patient armband removed and shredded. Pt in NAD at this time, no further needs expressed. Pt has ride home with brother.

## 2019-07-22 NOTE — DISCHARGE INSTRUCTIONS
Patient Education        Learning About Relief for Back Pain  What is back tension and strain? Back strain happens when you overstretch, or pull, a muscle in your back. You may hurt your back in an accident or when you exercise or lift something. Most back pain will get better with rest and time. You can take care of yourself at home to help your back heal.  What can you do first to relieve back pain? When you first feel back pain, try these steps:  · Walk. Take a short walk (10 to 20 minutes) on a level surface (no slopes, hills, or stairs) every 2 to 3 hours. Walk only distances you can manage without pain, especially leg pain. · Relax. Find a comfortable position for rest. Some people are comfortable on the floor or a medium-firm bed with a small pillow under their head and another under their knees. Some people prefer to lie on their side with a pillow between their knees. Don't stay in one position for too long. · Try heat or ice. Try using a heating pad on a low or medium setting, or take a warm shower, for 15 to 20 minutes every 2 to 3 hours. Or you can buy single-use heat wraps that last up to 8 hours. You can also try an ice pack for 10 to 15 minutes every 2 to 3 hours. You can use an ice pack or a bag of frozen vegetables wrapped in a thin towel. There is not strong evidence that either heat or ice will help, but you can try them to see if they help. You may also want to try switching between heat and cold. · Take pain medicine exactly as directed. ¨ If the doctor gave you a prescription medicine for pain, take it as prescribed. ¨ If you are not taking a prescription pain medicine, ask your doctor if you can take an over-the-counter medicine. What else can you do? · Stretch and exercise. Exercises that increase flexibility may relieve your pain and make it easier for your muscles to keep your spine in a good, neutral position. And don't forget to keep walking. · Do self-massage.  You can use self-massage to unwind after work or school or to energize yourself in the morning. You can easily massage your feet, hands, or neck. Self-massage works best if you are in comfortable clothes and are sitting or lying in a comfortable position. Use oil or lotion to massage bare skin. · Reduce stress. Back pain can lead to a vicious [place]: Distress about the pain tenses the muscles in your back, which in turn causes more pain. Learn how to relax your mind and your muscles to lower your stress. Where can you learn more? Go to http://devon-hallie.info/. Enter O841 in the search box to learn more about \"Learning About Relief for Back Pain. \"  Current as of: March 21, 2017  Content Version: 11.5  © 9150-5659 [employer]. Care instructions adapted under license by [employer] (which disclaims liability or warranty for this information). If you have questions about a medical condition or this instruction, always ask your healthcare professional. [address] any warranty or liability for your use of this information.

## 2019-07-22 NOTE — ED NOTES
Pt walked into er room with steady and balanced gait. Charge rn reported patient was standing outside hospital talking on his phone.

## 2019-07-22 NOTE — ED NOTES
Attempted to start iv and obtain ekg. rn was informed by provider that pt came up to desk reporting that he needed to step out and would be back. Pt is not in lobby of hospital and not in ER at this time. rn and provider notified.

## 2019-07-22 NOTE — ED TRIAGE NOTES
Slipped and fell on hardwood floor landing on back.  Pain between shoulder blades that radiates into neck
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
## 2019-09-18 ENCOUNTER — DOCUMENTATION ONLY (OUTPATIENT)
Dept: SURGERY | Age: 49
End: 2019-09-18

## 2019-09-18 NOTE — PROGRESS NOTES
Per [facility] requirements;  E-mail and letter sent for follow up appointment. [facility] [place]  [facility]  [facility]      Dear Patient,    Your health is our main concern. It is important for your health to have follow-up lab work and to see you surgeon at 2 months, 4 months, 6 months, 9 months and annually after your weight loss surgery. Additionally, the Department of Bariatric Surgery at our hospital is a member of the [employer] [address] Surgical Quality Improvement Program ([facility] NSQIP). As a participant in this program, we gather information on the outcomes of our patients after surgery. Please call the office for a follow up appointment at [phone]. If you have moved out of the area or have changed surgeons please call us and let us know the name of your doctor. Your health and feedback are important to us. We greatly appreciate your response.        Thank you,  [facility] [address] [facility]

## 2019-09-18 NOTE — LETTER
[facility] 
 
 
Dear Patient, Your health is our main concern. It is important for your health to have follow-up lab work and to see you surgeon at 2 months, 4 months, 6 months, 9 months and annually after your weight loss surgery. Additionally, the Department of Bariatric Surgery at our hospital is a member of the [employer] [address] Surgical Quality Improvement Program ([facility] NSQIP). As a participant in this program, we gather information on the outcomes of our patients after surgery. Please call the office for a follow up appointment at [phone]. If you have moved out of the area or have changed surgeons please call us and let us know the name of your doctor. Your health and feedback are important to us. We greatly appreciate your response. Thank you, [facility] [address] [place] [address],-1

## 2021-08-17 ENCOUNTER — HOSPITAL ENCOUNTER (INPATIENT)
Age: 51
LOS: 7 days | Discharge: LEFT BEFORE DISCHARGE | DRG: 095 | End: 2021-08-25
Attending: EMERGENCY MEDICINE | Admitting: FAMILY MEDICINE
Payer: MEDICARE

## 2021-08-17 DIAGNOSIS — F19.10 IV DRUG ABUSE (HCC): ICD-10-CM

## 2021-08-17 DIAGNOSIS — G06.1 ABSCESS IN EPIDURAL SPACE OF THORACIC SPINE: Primary | ICD-10-CM

## 2021-08-17 DIAGNOSIS — G89.29 OTHER CHRONIC PAIN: ICD-10-CM

## 2021-08-17 DIAGNOSIS — Z87.820 HISTORY OF TRAUMATIC BRAIN INJURY: ICD-10-CM

## 2021-08-17 DIAGNOSIS — Z87.898 HISTORY OF BACTEREMIA: ICD-10-CM

## 2021-08-17 DIAGNOSIS — N31.9 NEUROGENIC BLADDER: ICD-10-CM

## 2021-08-17 LAB
ALBUMIN SERPL-MCNC: 2.2 G/DL (ref 3.4–5)
ALBUMIN/GLOB SERPL: 0.4 {RATIO} (ref 0.8–1.7)
ALP SERPL-CCNC: 83 U/L (ref 45–117)
ALT SERPL-CCNC: 12 U/L (ref 16–61)
ANION GAP SERPL CALC-SCNC: 0 MMOL/L (ref 3–18)
AST SERPL-CCNC: 40 U/L (ref 10–38)
BASOPHILS # BLD: 0 K/UL (ref 0–0.1)
BASOPHILS NFR BLD: 0 % (ref 0–2)
BILIRUB SERPL-MCNC: 0.2 MG/DL (ref 0.2–1)
BUN SERPL-MCNC: 11 MG/DL (ref 7–18)
BUN/CREAT SERPL: 17 (ref 12–20)
CALCIUM SERPL-MCNC: 8.4 MG/DL (ref 8.5–10.1)
CHLORIDE SERPL-SCNC: 96 MMOL/L (ref 100–111)
CO2 SERPL-SCNC: 35 MMOL/L (ref 21–32)
CREAT SERPL-MCNC: 0.64 MG/DL (ref 0.6–1.3)
DIFFERENTIAL METHOD BLD: ABNORMAL
EOSINOPHIL # BLD: 0 K/UL (ref 0–0.4)
EOSINOPHIL NFR BLD: 1 % (ref 0–5)
ERYTHROCYTE [DISTWIDTH] IN BLOOD BY AUTOMATED COUNT: 17.6 % (ref 11.6–14.5)
ERYTHROCYTE [SEDIMENTATION RATE] IN BLOOD: 91 MM/HR (ref 0–15)
GLOBULIN SER CALC-MCNC: 5.6 G/DL (ref 2–4)
GLUCOSE SERPL-MCNC: 95 MG/DL (ref 74–99)
HCT VFR BLD AUTO: 29 % (ref 36–48)
HGB BLD-MCNC: 8.6 G/DL (ref 13–16)
LACTATE SERPL-SCNC: 1 MMOL/L (ref 0.4–2)
LYMPHOCYTES # BLD: 1.4 K/UL (ref 0.9–3.6)
LYMPHOCYTES NFR BLD: 40 % (ref 21–52)
MCH RBC QN AUTO: 23.1 PG (ref 24–34)
MCHC RBC AUTO-ENTMCNC: 29.7 G/DL (ref 31–37)
MCV RBC AUTO: 77.7 FL (ref 74–97)
MONOCYTES # BLD: 0.3 K/UL (ref 0.05–1.2)
MONOCYTES NFR BLD: 9 % (ref 3–10)
NEUTS SEG # BLD: 1.8 K/UL (ref 1.8–8)
NEUTS SEG NFR BLD: 50 % (ref 40–73)
PLATELET # BLD AUTO: 386 K/UL (ref 135–420)
PMV BLD AUTO: 9 FL (ref 9.2–11.8)
POTASSIUM SERPL-SCNC: 5.3 MMOL/L (ref 3.5–5.5)
PROT SERPL-MCNC: 7.8 G/DL (ref 6.4–8.2)
RBC # BLD AUTO: 3.73 M/UL (ref 4.35–5.65)
RBC MORPH BLD: ABNORMAL
RBC MORPH BLD: ABNORMAL
SODIUM SERPL-SCNC: 131 MMOL/L (ref 136–145)
WBC # BLD AUTO: 3.5 K/UL (ref 4.6–13.2)
WBC MORPH BLD: ABNORMAL

## 2021-08-17 PROCEDURE — 83605 ASSAY OF LACTIC ACID: CPT

## 2021-08-17 PROCEDURE — 85025 COMPLETE CBC W/AUTO DIFF WBC: CPT

## 2021-08-17 PROCEDURE — 99282 EMERGENCY DEPT VISIT SF MDM: CPT

## 2021-08-17 PROCEDURE — 80053 COMPREHEN METABOLIC PANEL: CPT

## 2021-08-17 PROCEDURE — 85652 RBC SED RATE AUTOMATED: CPT

## 2021-08-17 PROCEDURE — 81003 URINALYSIS AUTO W/O SCOPE: CPT

## 2021-08-17 PROCEDURE — 86140 C-REACTIVE PROTEIN: CPT

## 2021-08-17 PROCEDURE — 87040 BLOOD CULTURE FOR BACTERIA: CPT

## 2021-08-17 NOTE — Clinical Note
Status[de-identified] INPATIENT [101]   Type of Bed: Medical [8]   Cardiac Monitoring Required?: No   Inpatient Hospitalization Certified Necessary for the Following Reasons: 3.  Patient receiving treatment that can only be provided in an inpatient setting (further clarification in H&P documentation)   Admitting Diagnosis: Abscess in epidural space of thoracic spine [[id number]]   Admitting Physician: Wilmot Cockayne [[id number]]   Attending Physician: Wilmot Cockayne [[id number]]   Estimated Length of Stay: 3-4 Midnights   Discharge Plan[de-identified] Home with Office Follow-up

## 2021-08-17 NOTE — ED TRIAGE NOTES
Pt states he has MRSA to back, was previously admitted at [facility] but left AMA. Pt states he left before antibiotic treatment was completed. Would like to be admitted to get IV ABX.

## 2021-08-18 PROBLEM — G06.1 ABSCESS IN EPIDURAL SPACE OF THORACIC SPINE: Status: ACTIVE | Noted: 2021-08-18

## 2021-08-18 PROBLEM — F19.90 IVDU (INTRAVENOUS DRUG USER): Status: ACTIVE | Noted: 2021-08-18

## 2021-08-18 PROBLEM — F10.10 ALCOHOL ABUSE: Status: ACTIVE | Noted: 2021-08-18

## 2021-08-18 LAB
AMPHET UR QL SCN: POSITIVE
ANION GAP SERPL CALC-SCNC: 4 MMOL/L (ref 3–18)
APPEARANCE UR: CLEAR
BARBITURATES UR QL SCN: NEGATIVE
BENZODIAZ UR QL: NEGATIVE
BILIRUB UR QL: NEGATIVE
BUN SERPL-MCNC: 11 MG/DL (ref 7–18)
BUN/CREAT SERPL: 20 (ref 12–20)
CALCIUM SERPL-MCNC: 7.7 MG/DL (ref 8.5–10.1)
CANNABINOIDS UR QL SCN: NEGATIVE
CHLORIDE SERPL-SCNC: 98 MMOL/L (ref 100–111)
CO2 SERPL-SCNC: 30 MMOL/L (ref 21–32)
COCAINE UR QL SCN: POSITIVE
COLOR UR: YELLOW
CREAT SERPL-MCNC: 0.54 MG/DL (ref 0.6–1.3)
CRP SERPL-MCNC: 4.6 MG/DL (ref 0–0.3)
ERYTHROCYTE [DISTWIDTH] IN BLOOD BY AUTOMATED COUNT: 17.5 % (ref 11.6–14.5)
GLUCOSE SERPL-MCNC: 84 MG/DL (ref 74–99)
GLUCOSE UR STRIP.AUTO-MCNC: NEGATIVE MG/DL
HCT VFR BLD AUTO: 24 % (ref 36–48)
HDSCOM,HDSCOM: ABNORMAL
HGB BLD-MCNC: 7.2 G/DL (ref 13–16)
HGB UR QL STRIP: NEGATIVE
KETONES UR QL STRIP.AUTO: NEGATIVE MG/DL
LEUKOCYTE ESTERASE UR QL STRIP.AUTO: NEGATIVE
MCH RBC QN AUTO: 23.2 PG (ref 24–34)
MCHC RBC AUTO-ENTMCNC: 30 G/DL (ref 31–37)
MCV RBC AUTO: 77.2 FL (ref 74–97)
METHADONE UR QL: POSITIVE
NITRITE UR QL STRIP.AUTO: NEGATIVE
OPIATES UR QL: POSITIVE
PCP UR QL: NEGATIVE
PH UR STRIP: 7 [PH] (ref 5–8)
PLATELET # BLD AUTO: 268 K/UL (ref 135–420)
PMV BLD AUTO: 9 FL (ref 9.2–11.8)
POTASSIUM SERPL-SCNC: 4.4 MMOL/L (ref 3.5–5.5)
PROT UR STRIP-MCNC: NEGATIVE MG/DL
RBC # BLD AUTO: 3.11 M/UL (ref 4.35–5.65)
SODIUM SERPL-SCNC: 132 MMOL/L (ref 136–145)
SP GR UR REFRACTOMETRY: 1.01 (ref 1–1.03)
UROBILINOGEN UR QL STRIP.AUTO: 1 EU/DL (ref 0.2–1)
WBC # BLD AUTO: 2.7 K/UL (ref 4.6–13.2)

## 2021-08-18 PROCEDURE — 74011250636 HC RX REV CODE- 250/636: Performed by: PHYSICIAN ASSISTANT

## 2021-08-18 PROCEDURE — 74011250636 HC RX REV CODE- 250/636: Performed by: FAMILY MEDICINE

## 2021-08-18 PROCEDURE — 77030040830 HC CATH URETH FOL MDII -A

## 2021-08-18 PROCEDURE — 80048 BASIC METABOLIC PNL TOTAL CA: CPT

## 2021-08-18 PROCEDURE — 65270000029 HC RM PRIVATE

## 2021-08-18 PROCEDURE — 85027 COMPLETE CBC AUTOMATED: CPT

## 2021-08-18 PROCEDURE — 36415 COLL VENOUS BLD VENIPUNCTURE: CPT

## 2021-08-18 PROCEDURE — 77030019905 HC CATH URETH INTMIT MDII -A

## 2021-08-18 PROCEDURE — 80307 DRUG TEST PRSMV CHEM ANLYZR: CPT

## 2021-08-18 PROCEDURE — 74011250637 HC RX REV CODE- 250/637: Performed by: FAMILY MEDICINE

## 2021-08-18 PROCEDURE — 74011000250 HC RX REV CODE- 250: Performed by: FAMILY MEDICINE

## 2021-08-18 RX ORDER — CEFAZOLIN SODIUM/WATER 2 G/20 ML
2 SYRINGE (ML) INTRAVENOUS EVERY 8 HOURS
Status: DISCONTINUED | OUTPATIENT
Start: 2021-08-18 | End: 2021-08-23

## 2021-08-18 RX ORDER — TAMSULOSIN HYDROCHLORIDE 0.4 MG/1
0.4 CAPSULE ORAL DAILY
Status: DISCONTINUED | OUTPATIENT
Start: 2021-08-18 | End: 2021-08-25 | Stop reason: HOSPADM

## 2021-08-18 RX ORDER — BISACODYL 5 MG
5 TABLET, DELAYED RELEASE (ENTERIC COATED) ORAL DAILY PRN
Status: DISCONTINUED | OUTPATIENT
Start: 2021-08-18 | End: 2021-08-25 | Stop reason: HOSPADM

## 2021-08-18 RX ORDER — DOCUSATE SODIUM 100 MG/1
100 CAPSULE, LIQUID FILLED ORAL 2 TIMES DAILY
Status: DISCONTINUED | OUTPATIENT
Start: 2021-08-18 | End: 2021-08-25 | Stop reason: HOSPADM

## 2021-08-18 RX ORDER — ONDANSETRON 2 MG/ML
4 INJECTION INTRAMUSCULAR; INTRAVENOUS
Status: DISCONTINUED | OUTPATIENT
Start: 2021-08-18 | End: 2021-08-25 | Stop reason: HOSPADM

## 2021-08-18 RX ORDER — ACETAMINOPHEN 650 MG/1
650 SUPPOSITORY RECTAL
Status: DISCONTINUED | OUTPATIENT
Start: 2021-08-18 | End: 2021-08-25 | Stop reason: HOSPADM

## 2021-08-18 RX ORDER — SODIUM CHLORIDE 0.9 % (FLUSH) 0.9 %
5-40 SYRINGE (ML) INJECTION AS NEEDED
Status: DISCONTINUED | OUTPATIENT
Start: 2021-08-18 | End: 2021-08-18 | Stop reason: SDUPTHER

## 2021-08-18 RX ORDER — FAMOTIDINE 20 MG/1
20 TABLET, FILM COATED ORAL 2 TIMES DAILY
Status: DISCONTINUED | OUTPATIENT
Start: 2021-08-18 | End: 2021-08-25 | Stop reason: HOSPADM

## 2021-08-18 RX ORDER — IBUPROFEN 200 MG
1 TABLET ORAL DAILY
Status: DISCONTINUED | OUTPATIENT
Start: 2021-08-18 | End: 2021-08-25 | Stop reason: HOSPADM

## 2021-08-18 RX ORDER — METHADONE HYDROCHLORIDE 10 MG/1
10 TABLET ORAL 3 TIMES DAILY
Status: DISCONTINUED | OUTPATIENT
Start: 2021-08-18 | End: 2021-08-25 | Stop reason: HOSPADM

## 2021-08-18 RX ORDER — VANCOMYCIN 2 GRAM/500 ML IN 0.9 % SODIUM CHLORIDE INTRAVENOUS
2000 ONCE
Status: COMPLETED | OUTPATIENT
Start: 2021-08-18 | End: 2021-08-18

## 2021-08-18 RX ORDER — LORAZEPAM 2 MG/ML
2 INJECTION INTRAMUSCULAR
Status: DISCONTINUED | OUTPATIENT
Start: 2021-08-18 | End: 2021-08-25 | Stop reason: HOSPADM

## 2021-08-18 RX ORDER — ACETAMINOPHEN 325 MG/1
650 TABLET ORAL
Status: DISCONTINUED | OUTPATIENT
Start: 2021-08-18 | End: 2021-08-25 | Stop reason: HOSPADM

## 2021-08-18 RX ORDER — GUANFACINE HYDROCHLORIDE 1 MG/1
2 TABLET ORAL
Status: DISCONTINUED | OUTPATIENT
Start: 2021-08-18 | End: 2021-08-25 | Stop reason: HOSPADM

## 2021-08-18 RX ORDER — SODIUM CHLORIDE 0.9 % (FLUSH) 0.9 %
5-40 SYRINGE (ML) INJECTION EVERY 8 HOURS
Status: DISCONTINUED | OUTPATIENT
Start: 2021-08-18 | End: 2021-08-18 | Stop reason: SDUPTHER

## 2021-08-18 RX ORDER — LORAZEPAM 2 MG/ML
1 INJECTION INTRAMUSCULAR
Status: DISCONTINUED | OUTPATIENT
Start: 2021-08-18 | End: 2021-08-25 | Stop reason: HOSPADM

## 2021-08-18 RX ORDER — CLONAZEPAM 0.5 MG/1
1 TABLET ORAL
Status: DISCONTINUED | OUTPATIENT
Start: 2021-08-18 | End: 2021-08-25 | Stop reason: HOSPADM

## 2021-08-18 RX ORDER — METHADONE HYDROCHLORIDE 10 MG/1
10 TABLET ORAL 3 TIMES DAILY
COMMUNITY

## 2021-08-18 RX ORDER — SODIUM CHLORIDE 0.9 % (FLUSH) 0.9 %
5-40 SYRINGE (ML) INJECTION EVERY 8 HOURS
Status: DISCONTINUED | OUTPATIENT
Start: 2021-08-18 | End: 2021-08-22

## 2021-08-18 RX ORDER — LORAZEPAM 2 MG/ML
3 INJECTION INTRAMUSCULAR
Status: DISCONTINUED | OUTPATIENT
Start: 2021-08-18 | End: 2021-08-25 | Stop reason: HOSPADM

## 2021-08-18 RX ORDER — CLONAZEPAM 0.5 MG/1
2 TABLET ORAL
Status: DISCONTINUED | OUTPATIENT
Start: 2021-08-18 | End: 2021-08-25 | Stop reason: HOSPADM

## 2021-08-18 RX ORDER — ONDANSETRON 4 MG/1
4 TABLET, ORALLY DISINTEGRATING ORAL
Status: DISCONTINUED | OUTPATIENT
Start: 2021-08-18 | End: 2021-08-25 | Stop reason: HOSPADM

## 2021-08-18 RX ORDER — ENOXAPARIN SODIUM 100 MG/ML
40 INJECTION SUBCUTANEOUS DAILY
Status: DISCONTINUED | OUTPATIENT
Start: 2021-08-18 | End: 2021-08-25 | Stop reason: HOSPADM

## 2021-08-18 RX ORDER — HYDROMORPHONE HYDROCHLORIDE 1 MG/ML
1 INJECTION, SOLUTION INTRAMUSCULAR; INTRAVENOUS; SUBCUTANEOUS
Status: DISCONTINUED | OUTPATIENT
Start: 2021-08-18 | End: 2021-08-22

## 2021-08-18 RX ORDER — KETOROLAC TROMETHAMINE 30 MG/ML
30 INJECTION, SOLUTION INTRAMUSCULAR; INTRAVENOUS
Status: COMPLETED | OUTPATIENT
Start: 2021-08-18 | End: 2021-08-18

## 2021-08-18 RX ORDER — LORAZEPAM 1 MG/1
2 TABLET ORAL
Status: DISCONTINUED | OUTPATIENT
Start: 2021-08-18 | End: 2021-08-25 | Stop reason: HOSPADM

## 2021-08-18 RX ORDER — BACLOFEN 10 MG/1
10 TABLET ORAL 3 TIMES DAILY
Status: DISCONTINUED | OUTPATIENT
Start: 2021-08-18 | End: 2021-08-25 | Stop reason: HOSPADM

## 2021-08-18 RX ORDER — LEVOTHYROXINE SODIUM 75 UG/1
75 TABLET ORAL
Status: DISCONTINUED | OUTPATIENT
Start: 2021-08-18 | End: 2021-08-25 | Stop reason: HOSPADM

## 2021-08-18 RX ORDER — LORAZEPAM 1 MG/1
1 TABLET ORAL
Status: DISCONTINUED | OUTPATIENT
Start: 2021-08-18 | End: 2021-08-25 | Stop reason: HOSPADM

## 2021-08-18 RX ORDER — GABAPENTIN 300 MG/1
600 CAPSULE ORAL 3 TIMES DAILY
Status: DISCONTINUED | OUTPATIENT
Start: 2021-08-18 | End: 2021-08-25 | Stop reason: HOSPADM

## 2021-08-18 RX ORDER — SODIUM CHLORIDE 0.9 % (FLUSH) 0.9 %
5-40 SYRINGE (ML) INJECTION AS NEEDED
Status: DISCONTINUED | OUTPATIENT
Start: 2021-08-18 | End: 2021-08-22

## 2021-08-18 RX ADMIN — FAMOTIDINE 20 MG: 20 TABLET, FILM COATED ORAL at 21:14

## 2021-08-18 RX ADMIN — CEFAZOLIN SODIUM 2 G: 100 INJECTION, POWDER, LYOPHILIZED, FOR SOLUTION INTRAVENOUS at 03:17

## 2021-08-18 RX ADMIN — BACLOFEN 10 MG: 10 TABLET ORAL at 09:27

## 2021-08-18 RX ADMIN — METHADONE HYDROCHLORIDE 10 MG: 10 TABLET ORAL at 16:13

## 2021-08-18 RX ADMIN — CLONAZEPAM 2 MG: 0.5 TABLET ORAL at 03:18

## 2021-08-18 RX ADMIN — CLONAZEPAM 2 MG: 0.5 TABLET ORAL at 21:13

## 2021-08-18 RX ADMIN — TAMSULOSIN HYDROCHLORIDE 0.4 MG: 0.4 CAPSULE ORAL at 09:27

## 2021-08-18 RX ADMIN — BACLOFEN 10 MG: 10 TABLET ORAL at 16:13

## 2021-08-18 RX ADMIN — BACLOFEN 10 MG: 10 TABLET ORAL at 21:14

## 2021-08-18 RX ADMIN — GABAPENTIN 600 MG: 300 CAPSULE ORAL at 21:13

## 2021-08-18 RX ADMIN — HYDROMORPHONE HYDROCHLORIDE 1 MG: 1 INJECTION, SOLUTION INTRAMUSCULAR; INTRAVENOUS; SUBCUTANEOUS at 03:17

## 2021-08-18 RX ADMIN — Medication 10 ML: at 03:19

## 2021-08-18 RX ADMIN — GABAPENTIN 600 MG: 300 CAPSULE ORAL at 09:27

## 2021-08-18 RX ADMIN — Medication 10 ML: at 06:00

## 2021-08-18 RX ADMIN — METHADONE HYDROCHLORIDE 10 MG: 10 TABLET ORAL at 21:13

## 2021-08-18 RX ADMIN — KETOROLAC TROMETHAMINE 30 MG: 30 INJECTION, SOLUTION INTRAMUSCULAR; INTRAVENOUS at 00:34

## 2021-08-18 RX ADMIN — VANCOMYCIN HYDROCHLORIDE 2000 MG: 10 INJECTION, POWDER, LYOPHILIZED, FOR SOLUTION INTRAVENOUS at 03:47

## 2021-08-18 RX ADMIN — DOCUSATE SODIUM 100 MG: 100 CAPSULE, LIQUID FILLED ORAL at 21:13

## 2021-08-18 RX ADMIN — FOLIC ACID: 5 INJECTION, SOLUTION INTRAMUSCULAR; INTRAVENOUS; SUBCUTANEOUS at 03:17

## 2021-08-18 RX ADMIN — DOCUSATE SODIUM 100 MG: 100 CAPSULE, LIQUID FILLED ORAL at 09:27

## 2021-08-18 RX ADMIN — CEFAZOLIN SODIUM 2 G: 100 INJECTION, POWDER, LYOPHILIZED, FOR SOLUTION INTRAVENOUS at 09:28

## 2021-08-18 RX ADMIN — LEVOTHYROXINE SODIUM 75 MCG: 0.07 TABLET ORAL at 07:48

## 2021-08-18 RX ADMIN — ENOXAPARIN SODIUM 40 MG: 40 INJECTION SUBCUTANEOUS at 09:27

## 2021-08-18 RX ADMIN — GABAPENTIN 600 MG: 300 CAPSULE ORAL at 16:13

## 2021-08-18 RX ADMIN — CEFAZOLIN SODIUM 2 G: 100 INJECTION, POWDER, LYOPHILIZED, FOR SOLUTION INTRAVENOUS at 21:16

## 2021-08-18 RX ADMIN — Medication 10 ML: at 14:00

## 2021-08-18 RX ADMIN — FAMOTIDINE 20 MG: 20 TABLET, FILM COATED ORAL at 09:27

## 2021-08-18 NOTE — PROGRESS NOTES
Pharmacy Dosing Services: Vancomycin    Consult for Vancomycin Dosing by Pharmacy by Dr. Chivo Mathew provided for this 53y.o. year old male , for indication of Central Nervous System Infection. Day of Therapy 01    Ht Readings from Last 1 Encounters:   08/17/21 182.9 cm (72\")        Wt Readings from Last 1 Encounters:   08/18/21 89.6 kg (197 lb 8.5 oz)        Other Current Antibiotics Cefazolin 2g q8h   Significant Cultures Hx of MSSA   Serum Creatinine Lab Results   Component Value Date/Time    Creatinine 0.64 08/17/2021 10:20 PM      Creatinine Clearance Estimated Creatinine Clearance: 140.1 mL/min (by C-G formula based on SCr of 0.64 mg/dL). BUN Lab Results   Component Value Date/Time    BUN 11 08/17/2021 10:20 PM      WBC Lab Results   Component Value Date/Time    WBC 3.5 (L) 08/17/2021 10:20 PM      H/H Lab Results   Component Value Date/Time    HGB 8.6 (L) 08/17/2021 10:20 PM      Platelets Lab Results   Component Value Date/Time    PLATELET 607 98/76/0580 10:20 PM      Temp 98.5 °F (36.9 °C)     Start Vancomycin therapy, with loading dose of 2000 mg IV once on 8/18/2021 @0300. Followed with maintenance dose of Vancomycin 1000 mg IV q8h. Dose calculated to approximate a therapeutic trough of 15-20 mcg/mL. Pharmacy to follow daily and will make changes to dose and/or frequency based on clinical status.     Pharmacist Malu [phone]

## 2021-08-18 NOTE — ED NOTES
TRANSFER - OUT REPORT:    Verbal report given to Lise Johnson RN(name) on Donnie Metzger  being transferred to [address] [place] [address](unit) for routine progression of care       Report consisted of patients Situation, Background, Assessment and   Recommendations(SBAR). Information from the following report(s) SBAR, Kardex, ED Summary, [facility] and Recent Results was reviewed with the receiving nurse. Lines:   Peripheral IV 08/17/21 Left;Mid;Outer Forearm (Active)   Site Assessment Clean, dry, & intact 08/17/21 2215   Phlebitis Assessment 0 08/17/21 2215   Infiltration Assessment 0 08/17/21 2215   Dressing Status Clean, dry, & intact; Occlusive 08/17/21 2215   Dressing Type Transparent 08/17/21 2215   Hub Color/Line Status Pink;Flushed;Patent 08/17/21 2215   Action Taken Blood drawn 08/17/21 2215        Opportunity for questions and clarification was provided.       Patient transported with:   Registered Nurse 0 = understands/communicates without difficulty
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
## 2021-08-18 NOTE — PROGRESS NOTES
Received verbal report from STEPHANIE Chester RN. Patient arrived to floor via stretcher. Vital signs stable, afebrile. Patient A&O x 4, c/o stabbing pain to back rating at 10/10. Patient ambulatory with walker assist.  Lower extremity weakness. Admission database completed.  writing orders. Patient on contact precautions for MRSA.    0317 - Patient given Dilaudid 1 mg po for pain. Attempted to complete MRI questions, but patient falling asleep. Patient stated has titanium in back and cervical areas, but has had MRI in the past.    0420 - Urine specimen sent for drug screen. 0740 - Bedside and Verbal shift change report given to PRASAD Hutchison (oncoming nurse) by Heather Richard (offgoing nurse). Report included the following information SBAR, Kardex, Intake/Output and MAR.
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
## 2021-08-18 NOTE — PROGRESS NOTES
Care Management    Reason for Admission: Abscess in epidural space of thoracic spine    Chart reviewed. Per H&P: Lorenza Willis is a 53 y.o. male has a past history of depression, ADHD,  DVT, Hepatitis C, HTN, cellulitis with MRSA infection of left leg, TBI, HLD, anxiety, and PTSD. He also has a history of IVDA with heroin and cocaine, also has h/o alcohol use, approximately 6-12 pack per night. He was admitted for bacterial spinal epidural abscess and bacteremia at [facility] on 8/2/21 and was receiving IV cefepime and Vancomycin. On 08/09, patient decided to leave AMA apparently as he was not being able to pay his bills online and wanted to go to the bank. He came back to emergency department on 08/10 apparently after finishing his tasks. He apparently also admitted snorting heroin before coming to emergency department and was readmitted. On 8/12 he got PICC placed. He apparently left AMA for the second time 8/16/21. During his hospitalization, there was concern by hospital staff that he was using his PICC line to use IV drugs that were surreptitiously brought into the hospital.   He presents in the ED today with intermittent fevers, chills, leg weakness and malaise to continue his treatment. Apparently, he was supposed to receive IV antibiotics for 6 to 8 weeks, but left the hospital AMA because he did not like how he was being treated. Denies bowel or bladder issues. \"    Care Management/Patient Conversation: 8/19/21 @ 1045: Chart reviewed. CM spoke with the patient at the bedside and informed him of the CM's role. The patient confirmed demographics and that he currently does not have a PCP, but he is agreeable to CMS arranging one for him. CM and the patient discussed discharge plans to include transportation upon discharge, DME, family support, and transportation to and from appointments.   The patient reports that prior to this admission he was living in his own apartment with some roommates ([address] [place] [address], [address], [address] [place]). He states that this apartment is up one flight of steps and that he will likely discharge to his girlfriend's house at [address] [place] [address]. Patient states that this address is what he would like to list this address as his primary address. The patient states that he has been using a rollator for ambulation prior to this admission and that he does not drive because he doesn't have a vehicle. The patient states that his girlfriend provides necessary transportation. The patient denies any questions or concerns at this time. CM provided the patient with this CM's contact information and will continue to follow the patient's progress and be available to assist with discharge planning as needed. CMS referral placed. Prior to admission patient was living: Apartment with roommates    Prior to admission patient was using the following DME: Rollator                   RUR Score: 17%                   Plan for utilizing home health: TBD         COVID Vaccine Status: Received the first dose of Moreno Peter     PCP: First and Last name: Needs new PCP, CMS to assist   Name of Practice:    Are you a current patient: Yes/No:    Approximate date of last visit:    Can you participate in a virtual visit with your PCP:                     Current Advanced Directive/Advance Care Plan: Full Code no ACP docs on file - patient would like to speak with the NURIS to inform the     Healthcare Decision Maker: Girlfriend: Brady Gomez" Tia Peng: [phone]                         Transition of Care Plan: In progress       Care Management Interventions  PCP Verified by CM: Yes (needs one, CMS to arrange)  Mode of Transport at Discharge:  Other (see comment) (family/girlfriend)  Transition of Care Consult (CM Consult): Discharge Planning  Current Support Network: Own Home (with roommates)  Confirm Follow Up Transport: Family  The Plan for Transition of Care is Related to the Following Treatment Goals : Abscess in epidural space of thoracic spine  Discharge Location  Discharge Placement: Home with family assistance
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
## 2021-08-19 ENCOUNTER — APPOINTMENT (OUTPATIENT)
Dept: MRI IMAGING | Age: 51
DRG: 095 | End: 2021-08-19
Attending: FAMILY MEDICINE
Payer: MEDICARE

## 2021-08-19 PROBLEM — F19.20 POLYSUBSTANCE DEPENDENCE INCLUDING OPIOID TYPE DRUG WITH COMPLICATION, EPISODIC ABUSE (HCC): Status: ACTIVE | Noted: 2021-08-19

## 2021-08-19 LAB
ANION GAP SERPL CALC-SCNC: 3 MMOL/L (ref 3–18)
BUN SERPL-MCNC: 10 MG/DL (ref 7–18)
BUN/CREAT SERPL: 14 (ref 12–20)
CALCIUM SERPL-MCNC: 8.4 MG/DL (ref 8.5–10.1)
CHLORIDE SERPL-SCNC: 98 MMOL/L (ref 100–111)
CO2 SERPL-SCNC: 33 MMOL/L (ref 21–32)
CREAT SERPL-MCNC: 0.72 MG/DL (ref 0.6–1.3)
ERYTHROCYTE [DISTWIDTH] IN BLOOD BY AUTOMATED COUNT: 17.4 % (ref 11.6–14.5)
GLUCOSE SERPL-MCNC: 74 MG/DL (ref 74–99)
HBV SURFACE AB SER QL IA: POSITIVE
HBV SURFACE AB SERPL IA-ACNC: 787.35 MIU/ML
HBV SURFACE AG SER QL: <0.1 INDEX
HBV SURFACE AG SER QL: NEGATIVE
HCT VFR BLD AUTO: 32.1 % (ref 36–48)
HCV AB SER IA-ACNC: >11 INDEX
HCV AB SERPL QL IA: POSITIVE
HCV COMMENT,HCGAC: ABNORMAL
HEP BS AB COMMENT,HBSAC: NORMAL
HGB BLD-MCNC: 9.4 G/DL (ref 13–16)
MCH RBC QN AUTO: 23 PG (ref 24–34)
MCHC RBC AUTO-ENTMCNC: 29.3 G/DL (ref 31–37)
MCV RBC AUTO: 78.7 FL (ref 74–97)
PLATELET # BLD AUTO: 325 K/UL (ref 135–420)
PMV BLD AUTO: 9.2 FL (ref 9.2–11.8)
POTASSIUM SERPL-SCNC: 4.9 MMOL/L (ref 3.5–5.5)
RBC # BLD AUTO: 4.08 M/UL (ref 4.35–5.65)
SODIUM SERPL-SCNC: 134 MMOL/L (ref 136–145)
WBC # BLD AUTO: 2.6 K/UL (ref 4.6–13.2)

## 2021-08-19 PROCEDURE — 77030019905 HC CATH URETH INTMIT MDII -A

## 2021-08-19 PROCEDURE — 80048 BASIC METABOLIC PNL TOTAL CA: CPT

## 2021-08-19 PROCEDURE — 86708 HEPATITIS A ANTIBODY: CPT

## 2021-08-19 PROCEDURE — 72158 MRI LUMBAR SPINE W/O & W/DYE: CPT

## 2021-08-19 PROCEDURE — 86706 HEP B SURFACE ANTIBODY: CPT

## 2021-08-19 PROCEDURE — 74011250636 HC RX REV CODE- 250/636: Performed by: FAMILY MEDICINE

## 2021-08-19 PROCEDURE — 36415 COLL VENOUS BLD VENIPUNCTURE: CPT

## 2021-08-19 PROCEDURE — 74011000250 HC RX REV CODE- 250: Performed by: FAMILY MEDICINE

## 2021-08-19 PROCEDURE — 87340 HEPATITIS B SURFACE AG IA: CPT

## 2021-08-19 PROCEDURE — 86803 HEPATITIS C AB TEST: CPT

## 2021-08-19 PROCEDURE — 72157 MRI CHEST SPINE W/O & W/DYE: CPT

## 2021-08-19 PROCEDURE — 86704 HEP B CORE ANTIBODY TOTAL: CPT

## 2021-08-19 PROCEDURE — 85027 COMPLETE CBC AUTOMATED: CPT

## 2021-08-19 PROCEDURE — 65270000029 HC RM PRIVATE

## 2021-08-19 PROCEDURE — 87389 HIV-1 AG W/HIV-1&-2 AB AG IA: CPT

## 2021-08-19 PROCEDURE — 74011250637 HC RX REV CODE- 250/637: Performed by: FAMILY MEDICINE

## 2021-08-19 PROCEDURE — A9577 INJ MULTIHANCE: HCPCS | Performed by: FAMILY MEDICINE

## 2021-08-19 RX ADMIN — Medication 10 ML: at 22:10

## 2021-08-19 RX ADMIN — CEFAZOLIN SODIUM 2 G: 100 INJECTION, POWDER, LYOPHILIZED, FOR SOLUTION INTRAVENOUS at 15:58

## 2021-08-19 RX ADMIN — BACLOFEN 10 MG: 10 TABLET ORAL at 22:09

## 2021-08-19 RX ADMIN — GADOBENATE DIMEGLUMINE 19 ML: 529 INJECTION, SOLUTION INTRAVENOUS at 12:26

## 2021-08-19 RX ADMIN — METHADONE HYDROCHLORIDE 10 MG: 10 TABLET ORAL at 22:09

## 2021-08-19 RX ADMIN — CLONAZEPAM 2 MG: 0.5 TABLET ORAL at 22:08

## 2021-08-19 RX ADMIN — TAMSULOSIN HYDROCHLORIDE 0.4 MG: 0.4 CAPSULE ORAL at 09:52

## 2021-08-19 RX ADMIN — METHADONE HYDROCHLORIDE 10 MG: 10 TABLET ORAL at 09:52

## 2021-08-19 RX ADMIN — CEFAZOLIN SODIUM 2 G: 100 INJECTION, POWDER, LYOPHILIZED, FOR SOLUTION INTRAVENOUS at 05:41

## 2021-08-19 RX ADMIN — GABAPENTIN 600 MG: 300 CAPSULE ORAL at 15:59

## 2021-08-19 RX ADMIN — FAMOTIDINE 20 MG: 20 TABLET, FILM COATED ORAL at 22:09

## 2021-08-19 RX ADMIN — DOCUSATE SODIUM 100 MG: 100 CAPSULE, LIQUID FILLED ORAL at 22:09

## 2021-08-19 RX ADMIN — FAMOTIDINE 20 MG: 20 TABLET, FILM COATED ORAL at 09:52

## 2021-08-19 RX ADMIN — GABAPENTIN 600 MG: 300 CAPSULE ORAL at 09:52

## 2021-08-19 RX ADMIN — CEFAZOLIN SODIUM 2 G: 100 INJECTION, POWDER, LYOPHILIZED, FOR SOLUTION INTRAVENOUS at 22:08

## 2021-08-19 RX ADMIN — DOCUSATE SODIUM 100 MG: 100 CAPSULE, LIQUID FILLED ORAL at 09:52

## 2021-08-19 RX ADMIN — LEVOTHYROXINE SODIUM 75 MCG: 0.07 TABLET ORAL at 09:52

## 2021-08-19 RX ADMIN — GABAPENTIN 600 MG: 300 CAPSULE ORAL at 22:09

## 2021-08-19 RX ADMIN — BACLOFEN 10 MG: 10 TABLET ORAL at 09:52

## 2021-08-19 RX ADMIN — ONDANSETRON 4 MG: 2 INJECTION INTRAMUSCULAR; INTRAVENOUS at 22:25

## 2021-08-19 RX ADMIN — METHADONE HYDROCHLORIDE 10 MG: 10 TABLET ORAL at 15:59

## 2021-08-19 RX ADMIN — ENOXAPARIN SODIUM 40 MG: 40 INJECTION SUBCUTANEOUS at 09:51

## 2021-08-19 RX ADMIN — BACLOFEN 10 MG: 10 TABLET ORAL at 15:59

## 2021-08-19 NOTE — PROGRESS NOTES
Hospitalist Progress Note    Patient: Dusty Villegas MRN: [id number]  [facility]: [id number]    YOB: 1970  Age: 53 y.o. Sex: male    DOA: 8/17/2021 LOS:  LOS: 0 days            Chief complaint :  Epidural abscess, bacteremia    Assessment/Plan     Patient Active Problem List   Diagnosis Code    Traumatic brain injury ([facility] [address] S06. 9X5A    PTSD (post-traumatic stress disorder) F43.10    Thyroid disease E07.9    Morbid obesity (HCC) E66.01    Hypertension I10    Hypercholesterolemia E78.00    Sleep apnea G47.30    GERD (gastroesophageal reflux disease) K21.9    Migraine G43.909    BPH (benign prostatic hypertrophy) N40.0    Chronic back pain M54.9, G89.29    Intestinal malabsorption K90.9    Ankle impingement syndrome M25.879    Ankle instability M25.373    Peroneal tenosynovitis M65.9    DDD (degenerative disc disease), lumbar M51.36    Lumbar surgical wound fluid collection T81.89XA    MRSA (methicillin resistant Staphylococcus aureus) infection A49.02    Abscess in epidural space of thoracic spine G06.1    Alcohol abuse F10.10    IVDU (intravenous drug user) F19.90          Dusty Villegas is a 53 y.o. male has a past history of depression, ADHD,  DVT, Hepatitis C, HTN, cellulitis with MRSA infection of left leg, TBI, HLD, anxiety, and PTSD and recent incomplete treatment for epidural abscess and bacteremia. Admitted to resume treatment for epidural abscess and bacteremia. Per RN, patient was awake and alert, but became more lethargic after brother visited. He is not able to keep himself awake for discussion.  Falling asleep after each question.      Bacterial spinal epidural abscess -  Ordered IV cefazolin   ID following.     Bacteremia -  Follow up blood cultures      H/o IVDU -  Alleged concerning behavior involving PICC line at another facility   UDS positive for amphetamines, cocaine, methadone and opiates     H/o alcohol abuse -  CIWA protocol   Banana bag     Hypertension -  Resume home medications      H/o TBI and PTSD -  Resume home meds  Supportive care     DVT prophylaxis: Lovenox  GI prophylaxis: pepcid      Disposition : 2-3 days    Review of systems  General: No fevers or chills. Cardiovascular: No chest pain or pressure. No palpitations. Pulmonary: No shortness of breath. Gastrointestinal: No nausea, vomiting. Physical Exam:  General: As above  HEENT: NC, Atraumatic. PERRLA, anicteric sclerae. Lungs: CTA Bilaterally. No Wheezing/Rhonchi/Rales. Heart:  S1 S2,  No murmur, No Rubs, No Gallops  Abdomen: Soft, Non distended, Non tender.  +Bowel sounds,   Extremities: No c/c/e  Psych:   Not anxious or agitated. Neurologic:  Not able to test due to patient being somnolent. Open Wound left calf      Vital signs/Intake and Output:  Visit Vitals  /61   Pulse 73   Temp 98.1 °F (36.7 °C)   Resp 16   Ht 6' (1.829 m)   Wt 89.6 kg (197 lb 8.5 oz)   SpO2 98%   BMI 26.79 kg/m²     Current Shift:  No intake/output data recorded. Last three shifts:  08/17 0701 - 08/18 1900  In: 1299 [P.O.:322; I.V.:977]  Out: 150 [Urine:150]            Labs: Results:       Chemistry Recent Labs     08/18/21  0552 08/17/21  2220   GLU 84 95   * 131*   K 4.4 5.3   CL 98* 96*   CO2 30 35*   BUN 11 11   CREA 0.54* 0.64   CA 7.7* 8.4*   AGAP 4 0*   BUCR 20 17   AP  --  83   TP  --  7.8   ALB  --  2.2*   GLOB  --  5.6*   AGRAT  --  0.4*      CBC w/Diff Recent Labs     08/18/21  0552 08/17/21  2220   WBC 2.7* 3.5*   RBC 3.11* 3.73*   HGB 7.2* 8.6*   HCT 24.0* 29.0*    386   GRANS  --  50   LYMPH  --  40   EOS  --  1      Cardiac Enzymes No results for input(s): CPK, CKND1, DICKSON in the last 72 hours. No lab exists for component: CKRMB, TROIP   Coagulation No results for input(s): PTP, INR, APTT, INREXT in the last 72 hours.     Lipid Panel No results found for: CHOL, CHOLPOCT, CHOLX, CHLST, CHOLV, 301515, HDL, HDLP, LDL, LDLC, DLDLP, 224314, VLDLC, VLDL, TGLX, TRIGL, TRIGP, TGLPOCT, CHHD, CHHDX   BNP No results for input(s): BNPP in the last 72 hours.    Liver Enzymes Recent Labs     08/17/21  2220   TP 7.8   ALB 2.2*   AP 83      Thyroid Studies No results found for: T4, T3U, TSH, TSHEXT     Procedures/imaging: see electronic medical records for all procedures/Xrays and details which were not copied into this note but were reviewed prior to creation of Plan
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
## 2021-08-19 NOTE — PROGRESS NOTES
Physician Progress Note      Anastasiia Kelly  CSN #:                  [id number]  :                       1970  ADMIT DATE:       2021 9:23 PM  [address] [place] DATE:  RESPONDING  PROVIDER #:        Lakeshia Peguero MD          QUERY TEXT:    Pt admitted with  epidural abscess  and has  positive UDS   and noted alcohol abuse, opiate abuse and heroin abuse  and maintained  on regular dose  methadone. Please document any correlating medical diagnosis in the medical record:       The medical record reflects the following:  Risk Factors: opiate abuse, heroin abuse  Clinical Indicators: Patient's +UDS,noted alcohol abuse, opiate abuse and heroin abuse  Treatment: Methadone    Thank You  Mane Montez RN CDI CRCR  [phone]  Options provided:  -- Opioid dependence  -- Other - I will add my own diagnosis  -- Disagree - Not applicable / Not valid  -- Disagree - Clinically unable to determine / Unknown  -- Refer to Clinical Documentation Reviewer    PROVIDER RESPONSE TEXT:    See progress note    Query created by: Patsy Elena on 2021 10:51 AM      Electronically signed by:  Lakeshia Peguero MD 2021 10:55 AM

## 2021-08-19 NOTE — PROGRESS NOTES
Hospitalist Progress Note    Patient: Nancy Trujillo MRN: [id number]  CSN: [id number]    YOB: 1970  Age: 53 y.o. Sex: male    DOA: 8/17/2021 LOS:  LOS: 1 day            Chief complaint :  Epidural abscess, bacteremia    Assessment/Plan     Patient Active Problem List   Diagnosis Code    Traumatic brain injury ([facility] S06. 9X5A    PTSD (post-traumatic stress disorder) F43.10    Thyroid disease E07.9    Morbid obesity (HCC) E66.01    Hypertension I10    Hypercholesterolemia E78.00    Sleep apnea G47.30    GERD (gastroesophageal reflux disease) K21.9    Migraine G43.909    BPH (benign prostatic hypertrophy) N40.0    Chronic back pain M54.9, G89.29    Intestinal malabsorption K90.9    Ankle impingement syndrome M25.879    Ankle instability M25.373    Peroneal tenosynovitis M65.9    DDD (degenerative disc disease), lumbar M51.36    Lumbar surgical wound fluid collection T81.89XA    MRSA (methicillin resistant Staphylococcus aureus) infection A49.02    Abscess in epidural space of thoracic spine G06.1    Alcohol abuse F10.10    IVDU (intravenous drug user) F19.90    Polysubstance dependence including opioid type drug with complication, episodic abuse [facility]) F19.20          Nancy Trujillo is a 53 y.o. male has a past history of depression, ADHD,  DVT, Hepatitis C, HTN, cellulitis with MRSA infection of left leg, TBI, HLD, anxiety, and PTSD and recent incomplete treatment for epidural abscess and bacteremia. Admitted to resume treatment for epidural abscess and bacteremia. Per RN, patient was awake and alert, but became more lethargic after brother visited. He is not able to keep himself awake for discussion.  Falling asleep after each question.      Bacterial spinal epidural abscess -  Ordered IV cefazolin   ID following.     Bacteremia -  Follow up blood cultures      Polysubstance abuse and dependence -  UDS positive for amphetamines, cocaine, methadone and opiates     H/o alcohol abuse -  WA protocol   Banana bag     Hypertension -  Resume home medications      H/o TBI and PTSD -  Resume home meds  Supportive care     DVT prophylaxis: Lovenox  GI prophylaxis: pepcid      Disposition : 2-3 days    Review of systems  General: No fevers or chills. Cardiovascular: No chest pain or pressure. No palpitations. Pulmonary: No shortness of breath. Gastrointestinal: No nausea, vomiting. Physical Exam:  General: As above  HEENT: NC, Atraumatic. PERRLA, anicteric sclerae. Lungs: CTA Bilaterally. No Wheezing/Rhonchi/Rales. Heart:  S1 S2,  No murmur, No Rubs, No Gallops  Abdomen: Soft, Non distended, Non tender.  +Bowel sounds,   Extremities: No c/c/e  Psych:   Not anxious or agitated. Neurologic:  Not able to test due to patient being somnolent. Open Wound left calf      Vital signs/Intake and Output:  Visit Vitals  /66 (BP 1 Location: Right upper arm, BP Patient Position: At rest)   Pulse 78   Temp 98.7 °F (37.1 °C)   Resp 18   Ht 6' (1.829 m)   Wt 90.8 kg (200 lb 2.8 oz)   SpO2 98%   BMI 27.15 kg/m²     Current Shift:  No intake/output data recorded. Last three shifts:  08/17 1901 - 08/19 0700  In: 1319 [P.O.:322; I.V.:997]  Out: 550 [Urine:550]            Labs: Results:       Chemistry Recent Labs     08/19/21  0100 08/18/21  0552 08/17/21  2220   GLU 74 84 95   * 132* 131*   K 4.9 4.4 5.3   CL 98* 98* 96*   CO2 33* 30 35*   BUN 10 11 11   CREA 0.72 0.54* 0.64   CA 8.4* 7.7* 8.4*   AGAP 3 4 0*   BUCR 14 20 17   AP  --   --  83   TP  --   --  7.8   ALB  --   --  2.2*   GLOB  --   --  5.6*   AGRAT  --   --  0.4*      CBC w/Diff Recent Labs     08/19/21  0100 08/18/21  0552 08/17/21  2220   WBC 2.6* 2.7* 3.5*   RBC 4.08* 3.11* 3.73*   HGB 9.4* 7.2* 8.6*   HCT 32.1* 24.0* 29.0*    268 386   GRANS  --   --  50   LYMPH  --   --  40   EOS  --   --  1      Cardiac Enzymes No results for input(s): CPK, CKND1, DICKSON in the last 72 hours.     No lab exists for component: CKRMB, TROIP   Coagulation No results for input(s): PTP, INR, APTT, INREXT, INREXT in the last 72 hours. Lipid Panel No results found for: CHOL, CHOLPOCT, CHOLX, CHLST, CHOLV, 537172, HDL, HDLP, LDL, LDLC, DLDLP, 258198, VLDLC, VLDL, TGLX, TRIGL, TRIGP, TGLPOCT, CHHD, CHHDX   BNP No results for input(s): BNPP in the last 72 hours.    Liver Enzymes Recent Labs     08/17/21  2220   TP 7.8   ALB 2.2*   AP 83      Thyroid Studies No results found for: T4, T3U, TSH, TSHEXT, TSHEXT     Procedures/imaging: see electronic medical records for all procedures/Xrays and details which were not copied into this note but were reviewed prior to creation of Plan

## 2021-08-19 NOTE — PROGRESS NOTES
Bedside and Verbal shift change report given to Sheryle Merle, RN (oncoming nurse) by JUNIOR Paulino RN (offgoing nurse). Report included the following information SBAR, Kardex, Intake/Output, MAR and Recent Results.

## 2021-08-20 LAB
ANION GAP SERPL CALC-SCNC: 3 MMOL/L (ref 3–18)
BASOPHILS # BLD: 0 K/UL (ref 0–0.1)
BASOPHILS NFR BLD: 0 % (ref 0–2)
BUN SERPL-MCNC: 8 MG/DL (ref 7–18)
BUN/CREAT SERPL: 16 (ref 12–20)
CALCIUM SERPL-MCNC: 8.3 MG/DL (ref 8.5–10.1)
CHLORIDE SERPL-SCNC: 103 MMOL/L (ref 100–111)
CO2 SERPL-SCNC: 30 MMOL/L (ref 21–32)
CREAT SERPL-MCNC: 0.5 MG/DL (ref 0.6–1.3)
DIFFERENTIAL METHOD BLD: ABNORMAL
EOSINOPHIL # BLD: 0 K/UL (ref 0–0.4)
EOSINOPHIL NFR BLD: 1 % (ref 0–5)
ERYTHROCYTE [DISTWIDTH] IN BLOOD BY AUTOMATED COUNT: 17.4 % (ref 11.6–14.5)
ERYTHROCYTE [DISTWIDTH] IN BLOOD BY AUTOMATED COUNT: 17.5 % (ref 11.6–14.5)
GLUCOSE SERPL-MCNC: 93 MG/DL (ref 74–99)
HAV AB SER QL IA: POSITIVE
HBV CORE AB SERPL QL IA: NEGATIVE
HCT VFR BLD AUTO: 25.4 % (ref 36–48)
HCT VFR BLD AUTO: 28.9 % (ref 36–48)
HGB BLD-MCNC: 7.6 G/DL (ref 13–16)
HGB BLD-MCNC: 8.6 G/DL (ref 13–16)
HIV 1+2 AB+HIV1 P24 AG SERPL QL IA: NONREACTIVE
HIV12 RESULT COMMENT, HHIVC: NORMAL
LYMPHOCYTES # BLD: 1 K/UL (ref 0.9–3.6)
LYMPHOCYTES NFR BLD: 41 % (ref 21–52)
MCH RBC QN AUTO: 23.4 PG (ref 24–34)
MCH RBC QN AUTO: 23.6 PG (ref 24–34)
MCHC RBC AUTO-ENTMCNC: 29.8 G/DL (ref 31–37)
MCHC RBC AUTO-ENTMCNC: 29.9 G/DL (ref 31–37)
MCV RBC AUTO: 78.2 FL (ref 74–97)
MCV RBC AUTO: 79.4 FL (ref 74–97)
MONOCYTES # BLD: 0.3 K/UL (ref 0.05–1.2)
MONOCYTES NFR BLD: 10 % (ref 3–10)
NEUTS SEG # BLD: 1.2 K/UL (ref 1.8–8)
NEUTS SEG NFR BLD: 48 % (ref 40–73)
PLATELET # BLD AUTO: 268 K/UL (ref 135–420)
PLATELET # BLD AUTO: 288 K/UL (ref 135–420)
PLATELET COMMENTS,PCOM: ABNORMAL
PMV BLD AUTO: 9.1 FL (ref 9.2–11.8)
PMV BLD AUTO: 9.3 FL (ref 9.2–11.8)
POTASSIUM SERPL-SCNC: 4.5 MMOL/L (ref 3.5–5.5)
RBC # BLD AUTO: 3.25 M/UL (ref 4.35–5.65)
RBC # BLD AUTO: 3.64 M/UL (ref 4.35–5.65)
RBC MORPH BLD: ABNORMAL
SODIUM SERPL-SCNC: 136 MMOL/L (ref 136–145)
WBC # BLD AUTO: 1.8 K/UL (ref 4.6–13.2)
WBC # BLD AUTO: 2.5 K/UL (ref 4.6–13.2)

## 2021-08-20 PROCEDURE — 36415 COLL VENOUS BLD VENIPUNCTURE: CPT

## 2021-08-20 PROCEDURE — 85027 COMPLETE CBC AUTOMATED: CPT

## 2021-08-20 PROCEDURE — 74011000250 HC RX REV CODE- 250: Performed by: FAMILY MEDICINE

## 2021-08-20 PROCEDURE — 74011250636 HC RX REV CODE- 250/636: Performed by: FAMILY MEDICINE

## 2021-08-20 PROCEDURE — 74011250637 HC RX REV CODE- 250/637: Performed by: FAMILY MEDICINE

## 2021-08-20 PROCEDURE — 80048 BASIC METABOLIC PNL TOTAL CA: CPT

## 2021-08-20 PROCEDURE — 85025 COMPLETE CBC W/AUTO DIFF WBC: CPT

## 2021-08-20 PROCEDURE — 77030019905 HC CATH URETH INTMIT MDII -A

## 2021-08-20 PROCEDURE — 65270000029 HC RM PRIVATE

## 2021-08-20 RX ADMIN — DOCUSATE SODIUM 100 MG: 100 CAPSULE, LIQUID FILLED ORAL at 21:44

## 2021-08-20 RX ADMIN — DOCUSATE SODIUM 100 MG: 100 CAPSULE, LIQUID FILLED ORAL at 11:46

## 2021-08-20 RX ADMIN — FAMOTIDINE 20 MG: 20 TABLET, FILM COATED ORAL at 09:14

## 2021-08-20 RX ADMIN — GABAPENTIN 600 MG: 300 CAPSULE ORAL at 15:27

## 2021-08-20 RX ADMIN — METHADONE HYDROCHLORIDE 10 MG: 10 TABLET ORAL at 15:27

## 2021-08-20 RX ADMIN — FAMOTIDINE 20 MG: 20 TABLET, FILM COATED ORAL at 21:44

## 2021-08-20 RX ADMIN — TAMSULOSIN HYDROCHLORIDE 0.4 MG: 0.4 CAPSULE ORAL at 11:46

## 2021-08-20 RX ADMIN — METHADONE HYDROCHLORIDE 10 MG: 10 TABLET ORAL at 09:21

## 2021-08-20 RX ADMIN — ONDANSETRON 4 MG: 2 INJECTION INTRAMUSCULAR; INTRAVENOUS at 20:57

## 2021-08-20 RX ADMIN — Medication 10 ML: at 21:45

## 2021-08-20 RX ADMIN — LEVOTHYROXINE SODIUM 75 MCG: 0.07 TABLET ORAL at 09:14

## 2021-08-20 RX ADMIN — CEFAZOLIN SODIUM 2 G: 100 INJECTION, POWDER, LYOPHILIZED, FOR SOLUTION INTRAVENOUS at 15:27

## 2021-08-20 RX ADMIN — ONDANSETRON 4 MG: 2 INJECTION INTRAMUSCULAR; INTRAVENOUS at 09:12

## 2021-08-20 RX ADMIN — Medication 10 ML: at 06:04

## 2021-08-20 RX ADMIN — CEFAZOLIN SODIUM 2 G: 100 INJECTION, POWDER, LYOPHILIZED, FOR SOLUTION INTRAVENOUS at 06:04

## 2021-08-20 RX ADMIN — CLONAZEPAM 2 MG: 0.5 TABLET ORAL at 21:44

## 2021-08-20 RX ADMIN — GABAPENTIN 600 MG: 300 CAPSULE ORAL at 21:44

## 2021-08-20 RX ADMIN — METHADONE HYDROCHLORIDE 10 MG: 10 TABLET ORAL at 21:44

## 2021-08-20 RX ADMIN — BACLOFEN 10 MG: 10 TABLET ORAL at 11:47

## 2021-08-20 RX ADMIN — CEFAZOLIN SODIUM 2 G: 100 INJECTION, POWDER, LYOPHILIZED, FOR SOLUTION INTRAVENOUS at 21:44

## 2021-08-20 RX ADMIN — ENOXAPARIN SODIUM 40 MG: 40 INJECTION SUBCUTANEOUS at 09:12

## 2021-08-20 RX ADMIN — Medication 10 ML: at 15:28

## 2021-08-20 RX ADMIN — ONDANSETRON 4 MG: 2 INJECTION INTRAMUSCULAR; INTRAVENOUS at 16:04

## 2021-08-20 RX ADMIN — BACLOFEN 10 MG: 10 TABLET ORAL at 21:44

## 2021-08-20 RX ADMIN — BACLOFEN 10 MG: 10 TABLET ORAL at 15:27

## 2021-08-20 NOTE — PROGRESS NOTES
9353 - Bedside shift report received from Elvia Yap, [address] [place] [address]. Assumed care of patient. 2016 - Assessment completed as per flowsheet. Patient drowsy, but arousable and oriented x4. Respirations even and unlabored. On room air. PRN Zofran administered for c/o nausea. Performs self-caths to urinate. Reports headache at 3/10 and tolerable. Patient resting in bed with call light in reach. 1604 - PRN Zofran administered for c/o nausea.
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
## 2021-08-20 NOTE — PROGRESS NOTES
Problem: Risk for Spread of Infection  Goal: Prevent transmission of infectious organism to others  Description: Prevent the transmission of infectious organisms to other patients, staff members, and visitors.   Outcome: Progressing Towards Goal     Problem: Patient Education:  Go to Education Activity  Goal: Patient/Family Education  Outcome: Progressing Towards Goal     Problem: Patient Education: Go to Patient Education Activity  Goal: Patient/Family Education  Outcome: Progressing Towards Goal

## 2021-08-20 NOTE — PROGRESS NOTES
Problem: Risk for Spread of Infection  Goal: Prevent transmission of infectious organism to others  Description: Prevent the transmission of infectious organisms to other patients, staff members, and visitors. Outcome: Progressing Towards Goal     Problem: Patient Education:  Go to Education Activity  Goal: Patient/Family Education  Outcome: Progressing Towards Goal     Problem: Falls - Risk of  Goal: *Absence of Falls  Description: Document Oxana De Los Santos Fall Risk and appropriate interventions in the flowsheet.   Outcome: Progressing Towards Goal  Note: Fall Risk Interventions:  Mobility Interventions: Assess mobility with egress test, Bed/chair exit alarm         Medication Interventions: Bed/chair exit alarm    Elimination Interventions: Bed/chair exit alarm, Call light in reach    History of Falls Interventions: Bed/chair exit alarm, Consult care management for discharge planning, Door open when patient unattended, Evaluate medications/consider consulting pharmacy, Investigate reason for fall, Room close to nurse's station         Problem: Patient Education: Go to Patient Education Activity  Goal: Patient/Family Education  Outcome: Progressing Towards Goal

## 2021-08-20 NOTE — PROGRESS NOTES
Hospitalist Progress Note    Patient: Meenu Davis MRN: [id number]  CSN: [id number]    YOB: 1970  Age: 53 y.o. Sex: male    DOA: 8/17/2021 LOS:  LOS: 2 days            Chief complaint :  Epidural abscess, bacteremia    Assessment/Plan     Patient Active Problem List   Diagnosis Code    Traumatic brain injury ([address] S06. 9X5A    PTSD (post-traumatic stress disorder) F43.10    Thyroid disease E07.9    Morbid obesity (HCC) E66.01    Hypertension I10    Hypercholesterolemia E78.00    Sleep apnea G47.30    GERD (gastroesophageal reflux disease) K21.9    Migraine G43.909    BPH (benign prostatic hypertrophy) N40.0    Chronic back pain M54.9, G89.29    Intestinal malabsorption K90.9    Ankle impingement syndrome M25.879    Ankle instability M25.373    Peroneal tenosynovitis M65.9    DDD (degenerative disc disease), lumbar M51.36    Lumbar surgical wound fluid collection T81.89XA    MRSA (methicillin resistant Staphylococcus aureus) infection A49.02    Abscess in epidural space of thoracic spine G06.1    Alcohol abuse F10.10    IVDU (intravenous drug user) F19.90    Polysubstance dependence including opioid type drug with complication, episodic abuse [facility]) F19.20          Meenu Davis is a 53 y.o. male has a past history of depression, ADHD,  DVT, Hepatitis C, HTN, cellulitis with MRSA infection of left leg, TBI, HLD, anxiety, and PTSD and recent incomplete treatment for epidural abscess and bacteremia. Admitted to resume treatment for epidural abscess and bacteremia. Per RN, patient was awake and alert, but became more lethargic after brother visited. He is not able to keep himself awake for discussion.  Falling asleep after each question.      Bacterial spinal epidural abscess -  Ordered IV cefazolin   ID following.     Bacteremia -  Follow up blood cultures      Polysubstance abuse and dependence -  UDS positive for amphetamines, cocaine, methadone and opiates     H/o alcohol abuse -  CIWA protocol   Banana bag     Hypertension -  Resume home medications      H/o TBI and PTSD -  Resume home meds  Supportive care     DVT prophylaxis: Lovenox  GI prophylaxis: pepcid      Disposition : 2-3 days    Review of systems  General: No fevers or chills. Cardiovascular: No chest pain or pressure. No palpitations. Pulmonary: No shortness of breath. Gastrointestinal: No nausea, vomiting. Physical Exam:  General: As above  HEENT: NC, Atraumatic. PERRLA, anicteric sclerae. Lungs: CTA Bilaterally. No Wheezing/Rhonchi/Rales. Heart:  S1 S2,  No murmur, No Rubs, No Gallops  Abdomen: Soft, Non distended, Non tender.  +Bowel sounds,   Extremities: No c/c/e  Psych:   Not anxious or agitated. Neurologic:  Not able to test due to patient being somnolent. Open Wound left calf      Vital signs/Intake and Output:  Visit Vitals  /69 (BP 1 Location: Left upper arm, BP Patient Position: At rest)   Pulse 75   Temp 98.3 °F (36.8 °C)   Resp 18   Ht 6' (1.829 m)   Wt 87.9 kg (193 lb 12.6 oz)   SpO2 99%   BMI 26.28 kg/m²     Current Shift:  08/20 0701 - 08/20 1900  In: -   Out: 1800 [Urine:1800]  Last three shifts:  08/18 1901 - 08/20 0700  In: 260 [P.O.:240; I.V.:20]  Out: 2000 [Urine:2000]            Labs: Results:       Chemistry Recent Labs     08/20/21  0436 08/19/21  0100 08/18/21  0552 08/17/21  2220 08/17/21  2220   GLU 93 74 84   < > 95    134* 132*   < > 131*   K 4.5 4.9 4.4   < > 5.3    98* 98*   < > 96*   CO2 30 33* 30   < > 35*   BUN 8 10 11   < > 11   CREA 0.50* 0.72 0.54*   < > 0.64   CA 8.3* 8.4* 7.7*   < > 8.4*   AGAP 3 3 4   < > 0*   BUCR 16 14 20   < > 17   AP  --   --   --   --  83   TP  --   --   --   --  7.8   ALB  --   --   --   --  2.2*   GLOB  --   --   --   --  5.6*   AGRAT  --   --   --   --  0.4*    < > = values in this interval not displayed.       CBC w/Diff Recent Labs     08/20/21  1215 08/20/21  0436 08/19/21  0100 08/18/21  0552 08/17/21  2220   WBC 2.5* 1. 8* 2.6*   < > 3.5*   RBC 3.64* 3.25* 4.08*   < > 3.73*   HGB 8.6* 7.6* 9.4*   < > 8.6*   HCT 28.9* 25.4* 32.1*   < > 29.0*    268 325   < > 386   GRANS 48  --   --   --  50   LYMPH 41  --   --   --  40   EOS 1  --   --   --  1    < > = values in this interval not displayed. Cardiac Enzymes No results for input(s): CPK, CKND1, DICKSON in the last 72 hours. No lab exists for component: CKRMB, TROIP   Coagulation No results for input(s): PTP, INR, APTT, INREXT, INREXT in the last 72 hours. Lipid Panel No results found for: CHOL, CHOLPOCT, CHOLX, CHLST, CHOLV, 731967, HDL, HDLP, LDL, LDLC, DLDLP, 981157, VLDLC, VLDL, TGLX, TRIGL, TRIGP, TGLPOCT, CHHD, CHHDX   BNP No results for input(s): BNPP in the last 72 hours.    Liver Enzymes Recent Labs     08/17/21  2220   TP 7.8   ALB 2.2*   AP 83      Thyroid Studies No results found for: T4, T3U, TSH, TSHEXT, TSHEXT     Procedures/imaging: see electronic medical records for all procedures/Xrays and details which were not copied into this note but were reviewed prior to creation of Plan

## 2021-08-20 NOTE — PROGRESS NOTES
Care Management    Discharge Planning: In progress    CM spoke with the patient regarding plans for discharge. CM reviewed with the patient that there is a chance that he may require long term antibiotics via a PICC line and in that case he would need to transfer to an different inpatient facility for the duration of his IV antibiotic regimen. The patient confirms that he is a , 100% service connected. CM discussed with the patient that he may be able to transfer to the [place] or to a [facility] to finish out his antibiotics. The patient states that [place] is too far but he would be agreeable to go to the [place]. CM to follow the patient's progress and be available to assist with discharge planning as needed. CMS referral placed. Care Management Interventions  PCP Verified by CM: Yes (needs one, CMS to arrange)  Mode of Transport at Discharge:  Other (see comment) (family/girlfriend)  Transition of Care Consult (CM Consult): Discharge Planning  Current Support Network: Own Home (with roommates)  Confirm Follow Up Transport: Family  The Plan for Transition of Care is Related to the Following Treatment Goals : Abscess in epidural space of thoracic spine  Discharge Location  Discharge Placement: [facility] (vs another inpatient facility for IV antibiotics)

## 2021-08-21 LAB
ANION GAP SERPL CALC-SCNC: 3 MMOL/L (ref 3–18)
BASOPHILS # BLD: 0 K/UL (ref 0–0.1)
BASOPHILS NFR BLD: 0 % (ref 0–2)
BUN SERPL-MCNC: 8 MG/DL (ref 7–18)
BUN/CREAT SERPL: 16 (ref 12–20)
CALCIUM SERPL-MCNC: 8.2 MG/DL (ref 8.5–10.1)
CHLORIDE SERPL-SCNC: 102 MMOL/L (ref 100–111)
CO2 SERPL-SCNC: 31 MMOL/L (ref 21–32)
CREAT SERPL-MCNC: 0.5 MG/DL (ref 0.6–1.3)
DIFFERENTIAL METHOD BLD: ABNORMAL
EOSINOPHIL # BLD: 0 K/UL (ref 0–0.4)
EOSINOPHIL NFR BLD: 1 % (ref 0–5)
ERYTHROCYTE [DISTWIDTH] IN BLOOD BY AUTOMATED COUNT: 17.4 % (ref 11.6–14.5)
FOLATE SERPL-MCNC: 16.8 NG/ML (ref 3.1–17.5)
GLUCOSE SERPL-MCNC: 87 MG/DL (ref 74–99)
HCT VFR BLD AUTO: 26.7 % (ref 36–48)
HGB BLD-MCNC: 8 G/DL (ref 13–16)
LYMPHOCYTES # BLD: 1.1 K/UL (ref 0.9–3.6)
LYMPHOCYTES NFR BLD: 44 % (ref 21–52)
MCH RBC QN AUTO: 23.1 PG (ref 24–34)
MCHC RBC AUTO-ENTMCNC: 30 G/DL (ref 31–37)
MCV RBC AUTO: 76.9 FL (ref 74–97)
MONOCYTES # BLD: 0.9 K/UL (ref 0.05–1.2)
MONOCYTES NFR BLD: 8 % (ref 3–10)
NEUTS SEG # BLD: 1.1 K/UL (ref 1.8–8)
NEUTS SEG NFR BLD: 46 % (ref 40–73)
PLATELET # BLD AUTO: 261 K/UL (ref 135–420)
PMV BLD AUTO: 9.2 FL (ref 9.2–11.8)
POTASSIUM SERPL-SCNC: 4.6 MMOL/L (ref 3.5–5.5)
RBC # BLD AUTO: 3.47 M/UL (ref 4.35–5.65)
SODIUM SERPL-SCNC: 136 MMOL/L (ref 136–145)
VIT B12 SERPL-MCNC: 289 PG/ML (ref 211–911)
WBC # BLD AUTO: 2.4 K/UL (ref 4.6–13.2)

## 2021-08-21 PROCEDURE — 87522 HEPATITIS C REVRS TRNSCRPJ: CPT

## 2021-08-21 PROCEDURE — 85025 COMPLETE CBC W/AUTO DIFF WBC: CPT

## 2021-08-21 PROCEDURE — 74011250637 HC RX REV CODE- 250/637: Performed by: FAMILY MEDICINE

## 2021-08-21 PROCEDURE — 80048 BASIC METABOLIC PNL TOTAL CA: CPT

## 2021-08-21 PROCEDURE — 74011000250 HC RX REV CODE- 250: Performed by: FAMILY MEDICINE

## 2021-08-21 PROCEDURE — 74011250636 HC RX REV CODE- 250/636: Performed by: FAMILY MEDICINE

## 2021-08-21 PROCEDURE — 36415 COLL VENOUS BLD VENIPUNCTURE: CPT

## 2021-08-21 PROCEDURE — 82607 VITAMIN B-12: CPT

## 2021-08-21 PROCEDURE — 65270000029 HC RM PRIVATE

## 2021-08-21 RX ADMIN — HYDROMORPHONE HYDROCHLORIDE 1 MG: 1 INJECTION, SOLUTION INTRAMUSCULAR; INTRAVENOUS; SUBCUTANEOUS at 12:13

## 2021-08-21 RX ADMIN — METHADONE HYDROCHLORIDE 10 MG: 10 TABLET ORAL at 09:52

## 2021-08-21 RX ADMIN — GABAPENTIN 600 MG: 300 CAPSULE ORAL at 09:52

## 2021-08-21 RX ADMIN — METHADONE HYDROCHLORIDE 10 MG: 10 TABLET ORAL at 14:13

## 2021-08-21 RX ADMIN — FAMOTIDINE 20 MG: 20 TABLET, FILM COATED ORAL at 09:52

## 2021-08-21 RX ADMIN — CEFAZOLIN SODIUM 2 G: 100 INJECTION, POWDER, LYOPHILIZED, FOR SOLUTION INTRAVENOUS at 17:58

## 2021-08-21 RX ADMIN — GABAPENTIN 600 MG: 300 CAPSULE ORAL at 17:58

## 2021-08-21 RX ADMIN — BISACODYL 5 MG: 5 TABLET, COATED ORAL at 22:04

## 2021-08-21 RX ADMIN — CEFAZOLIN SODIUM 2 G: 100 INJECTION, POWDER, LYOPHILIZED, FOR SOLUTION INTRAVENOUS at 09:51

## 2021-08-21 RX ADMIN — GABAPENTIN 600 MG: 300 CAPSULE ORAL at 22:01

## 2021-08-21 RX ADMIN — Medication 10 ML: at 14:12

## 2021-08-21 RX ADMIN — Medication 10 ML: at 22:02

## 2021-08-21 RX ADMIN — FAMOTIDINE 20 MG: 20 TABLET, FILM COATED ORAL at 20:12

## 2021-08-21 RX ADMIN — CLONAZEPAM 2 MG: 0.5 TABLET ORAL at 22:00

## 2021-08-21 RX ADMIN — DOCUSATE SODIUM 100 MG: 100 CAPSULE, LIQUID FILLED ORAL at 20:12

## 2021-08-21 RX ADMIN — TAMSULOSIN HYDROCHLORIDE 0.4 MG: 0.4 CAPSULE ORAL at 09:52

## 2021-08-21 RX ADMIN — BACLOFEN 10 MG: 10 TABLET ORAL at 22:01

## 2021-08-21 RX ADMIN — ENOXAPARIN SODIUM 40 MG: 40 INJECTION SUBCUTANEOUS at 09:52

## 2021-08-21 RX ADMIN — LEVOTHYROXINE SODIUM 75 MCG: 0.07 TABLET ORAL at 09:51

## 2021-08-21 RX ADMIN — BACLOFEN 10 MG: 10 TABLET ORAL at 17:58

## 2021-08-21 RX ADMIN — BACLOFEN 10 MG: 10 TABLET ORAL at 09:52

## 2021-08-21 RX ADMIN — METHADONE HYDROCHLORIDE 10 MG: 10 TABLET ORAL at 20:12

## 2021-08-21 RX ADMIN — DOCUSATE SODIUM 100 MG: 100 CAPSULE, LIQUID FILLED ORAL at 09:51

## 2021-08-21 RX ADMIN — ONDANSETRON 4 MG: 2 INJECTION INTRAMUSCULAR; INTRAVENOUS at 16:24

## 2021-08-21 NOTE — ROUTINE PROCESS
Bedside and Verbal shift change report given to Rebekah Catalan RN (oncoming nurse) by AMALIA Boo RN (offgoing nurse). Report included the following information SBAR, Kardex, ED Summary, Intake/Output, MAR and Recent Results.

## 2021-08-21 NOTE — PROGRESS NOTES
Problem: Risk for Spread of Infection  Goal: Prevent transmission of infectious organism to others  Description: Prevent the transmission of infectious organisms to other patients, staff members, and visitors. Outcome: Progressing Towards Goal     Problem: Falls - Risk of  Goal: *Absence of Falls  Description: Document Esthela Villar Fall Risk and appropriate interventions in the flowsheet.   Outcome: Progressing Towards Goal  Note: Fall Risk Interventions:  Mobility Interventions: Patient to call before getting OOB         Medication Interventions: Teach patient to arise slowly    Elimination Interventions: Call light in reach    History of Falls Interventions: Door open when patient unattended         Problem: Patient Education: Go to Patient Education Activity  Goal: Patient/Family Education  Outcome: Progressing Towards Goal

## 2021-08-21 NOTE — PROGRESS NOTES
Problem: Risk for Spread of Infection  Goal: Prevent transmission of infectious organism to others  Description: Prevent the transmission of infectious organisms to other patients, staff members, and visitors. Outcome: Progressing Towards Goal     Problem: Patient Education:  Go to Education Activity  Goal: Patient/Family Education  Outcome: Progressing Towards Goal     Problem: Falls - Risk of  Goal: *Absence of Falls  Description: Document Loli Ramires Fall Risk and appropriate interventions in the flowsheet.   Outcome: Progressing Towards Goal  Note: Fall Risk Interventions:  Mobility Interventions: Assess mobility with egress test, Bed/chair exit alarm         Medication Interventions: Teach patient to arise slowly    Elimination Interventions: Bed/chair exit alarm, Call light in reach    History of Falls Interventions: Bed/chair exit alarm, Consult care management for discharge planning, Door open when patient unattended, Evaluate medications/consider consulting pharmacy, Investigate reason for fall, Room close to nurse's station         Problem: Patient Education: Go to Patient Education Activity  Goal: Patient/Family Education  Outcome: Progressing Towards Goal

## 2021-08-21 NOTE — PROGRESS NOTES
Hospitalist Progress Note    Patient: Myrna Mg MRN: [id number]  CSN: [id number]    YOB: 1970  Age: 53 y.o. Sex: male    DOA: 8/17/2021 LOS:  LOS: 3 days            Chief complaint :  Epidural abscess, bacteremia    Assessment/Plan     Patient Active Problem List   Diagnosis Code    Traumatic brain injury ([facility] [address] S06. 9X5A    PTSD (post-traumatic stress disorder) F43.10    Thyroid disease E07.9    Morbid obesity (HCC) E66.01    Hypertension I10    Hypercholesterolemia E78.00    Sleep apnea G47.30    GERD (gastroesophageal reflux disease) K21.9    Migraine G43.909    BPH (benign prostatic hypertrophy) N40.0    Chronic back pain M54.9, G89.29    Intestinal malabsorption K90.9    Ankle impingement syndrome M25.879    Ankle instability M25.373    Peroneal tenosynovitis M65.9    DDD (degenerative disc disease), lumbar M51.36    Lumbar surgical wound fluid collection T81.89XA    MRSA (methicillin resistant Staphylococcus aureus) infection A49.02    Abscess in epidural space of thoracic spine G06.1    Alcohol abuse F10.10    IVDU (intravenous drug user) F19.90    Polysubstance dependence including opioid type drug with complication, episodic abuse [facility]) F19.20          Myrna Mg is a 53 y.o. male has a past history of depression, ADHD,  DVT, Hepatitis C, HTN, cellulitis with MRSA infection of left leg, TBI, HLD, anxiety, and PTSD and recent incomplete treatment for epidural abscess and bacteremia. Admitted to resume treatment for epidural abscess and bacteremia. Per RN, patient was awake and alert, but became more lethargic after brother visited. He is not able to keep himself awake for discussion.  Falling asleep after each question.      Bacterial spinal epidural abscess -  Ordered IV cefazolin   ID following.     Bacteremia -  Follow up blood cultures      Polysubstance abuse and dependence -  UDS positive for amphetamines, cocaine, methadone and opiates     H/o alcohol abuse -  CIWA protocol   Banana bag     Hypertension -  Resume home medications      H/o TBI and PTSD -  Resume home meds  Supportive care     DVT prophylaxis: Lovenox  GI prophylaxis: pepcid      Disposition : 2-3 days    Review of systems  General: No fevers or chills. Cardiovascular: No chest pain or pressure. No palpitations. Pulmonary: No shortness of breath. Gastrointestinal: No nausea, vomiting. Physical Exam:  General: As above  HEENT: NC, Atraumatic. PERRLA, anicteric sclerae. Lungs: CTA Bilaterally. No Wheezing/Rhonchi/Rales. Heart:  S1 S2,  No murmur, No Rubs, No Gallops  Abdomen: Soft, Non distended, Non tender.  +Bowel sounds,   Extremities: No c/c/e  Psych:   Not anxious or agitated. Neurologic:  Not able to test due to patient being somnolent. Open Wound left calf      Vital signs/Intake and Output:  Visit Vitals  /68 (BP 1 Location: Right upper arm, BP Patient Position: At rest)   Pulse 79   Temp 98.4 °F (36.9 °C)   Resp 18   Ht 6' (1.829 m)   Wt 87.9 kg (193 lb 12.6 oz)   SpO2 100%   BMI 26.28 kg/m²     Current Shift:  No intake/output data recorded. Last three shifts:  08/20 0701 - 08/21 1900  In: 600 [P.O.:600]  Out: 4400 [Urine:4400]            Labs: Results:       Chemistry Recent Labs     08/21/21  0308 08/20/21  0436 08/19/21  0100   GLU 87 93 74    136 134*   K 4.6 4.5 4.9    103 98*   CO2 31 30 33*   BUN 8 8 10   CREA 0.50* 0.50* 0.72   CA 8.2* 8.3* 8.4*   AGAP 3 3 3   BUCR 16 16 14      CBC w/Diff Recent Labs     08/21/21  0308 08/20/21  1215 08/20/21  0436   WBC 2.4* 2.5* 1.8*   RBC 3.47* 3.64* 3.25*   HGB 8.0* 8.6* 7.6*   HCT 26.7* 28.9* 25.4*    288 268   GRANS 46 48  --    LYMPH 44 41  --    EOS 1 1  --       Cardiac Enzymes No results for input(s): CPK, CKND1, DICKSON in the last 72 hours. No lab exists for component: CKRMB, TROIP   Coagulation No results for input(s): PTP, INR, APTT, INREXT, INREXT in the last 72 hours.     Lipid Panel No results found for: CHOL, CHOLPOCT, CHOLX, CHLST, CHOLV, 204201, HDL, HDLP, LDL, LDLC, DLDLP, 440245, VLDLC, VLDL, TGLX, TRIGL, TRIGP, TGLPOCT, CHHD, CHHDX   BNP No results for input(s): BNPP in the last 72 hours. Liver Enzymes No results for input(s): TP, ALB, TBIL, AP in the last 72 hours.     No lab exists for component: SGOT, GPT, DBIL   Thyroid Studies No results found for: T4, T3U, TSH, TSHEXT, TSHEXT     Procedures/imaging: see electronic medical records for all procedures/Xrays and details which were not copied into this note but were reviewed prior to creation of Plan

## 2021-08-21 NOTE — PROGRESS NOTES
32 61 16 - Bedside report received from [facility]. Patient in bed at this time. Pain 0/10. Plan of care for the day addressed with the patient. 1620 - Patient in bed at this time. A/O x 4. IV to left upper arm  intact and patent. Patient reports  Numbness/tingling to BLE that he says was preexisting. Pedal pulses palpable. Lungs clear, diminished. Bowel sounds active to all quadrants. Pain 6/10. Patient ha small scabs on BLE and BUE. 1935- Bedside and Verbal shift change report given to Becky Mauricio by Nisha Almanzar RN. Report included the following information SBAR, Kardex, OR Summary, Intake/Output and MAR.

## 2021-08-22 PROCEDURE — 65270000029 HC RM PRIVATE

## 2021-08-22 PROCEDURE — 74011000250 HC RX REV CODE- 250: Performed by: FAMILY MEDICINE

## 2021-08-22 PROCEDURE — 74011250637 HC RX REV CODE- 250/637: Performed by: FAMILY MEDICINE

## 2021-08-22 PROCEDURE — 74011250636 HC RX REV CODE- 250/636: Performed by: FAMILY MEDICINE

## 2021-08-22 RX ADMIN — TAMSULOSIN HYDROCHLORIDE 0.4 MG: 0.4 CAPSULE ORAL at 08:07

## 2021-08-22 RX ADMIN — FAMOTIDINE 20 MG: 20 TABLET, FILM COATED ORAL at 08:06

## 2021-08-22 RX ADMIN — BACLOFEN 10 MG: 10 TABLET ORAL at 16:56

## 2021-08-22 RX ADMIN — GABAPENTIN 600 MG: 300 CAPSULE ORAL at 13:10

## 2021-08-22 RX ADMIN — ENOXAPARIN SODIUM 40 MG: 40 INJECTION SUBCUTANEOUS at 08:07

## 2021-08-22 RX ADMIN — Medication 10 ML: at 14:00

## 2021-08-22 RX ADMIN — CEFAZOLIN SODIUM 2 G: 100 INJECTION, POWDER, LYOPHILIZED, FOR SOLUTION INTRAVENOUS at 02:00

## 2021-08-22 RX ADMIN — FAMOTIDINE 20 MG: 20 TABLET, FILM COATED ORAL at 21:17

## 2021-08-22 RX ADMIN — METHADONE HYDROCHLORIDE 10 MG: 10 TABLET ORAL at 10:23

## 2021-08-22 RX ADMIN — Medication 10 ML: at 06:11

## 2021-08-22 RX ADMIN — CEFAZOLIN SODIUM 2 G: 100 INJECTION, POWDER, LYOPHILIZED, FOR SOLUTION INTRAVENOUS at 09:49

## 2021-08-22 RX ADMIN — CLONAZEPAM 2 MG: 0.5 TABLET ORAL at 21:16

## 2021-08-22 RX ADMIN — BISACODYL 5 MG: 5 TABLET, COATED ORAL at 09:49

## 2021-08-22 RX ADMIN — CEFAZOLIN SODIUM 2 G: 100 INJECTION, POWDER, LYOPHILIZED, FOR SOLUTION INTRAVENOUS at 17:03

## 2021-08-22 RX ADMIN — DOCUSATE SODIUM 100 MG: 100 CAPSULE, LIQUID FILLED ORAL at 08:07

## 2021-08-22 RX ADMIN — DOCUSATE SODIUM 100 MG: 100 CAPSULE, LIQUID FILLED ORAL at 21:17

## 2021-08-22 RX ADMIN — BACLOFEN 10 MG: 10 TABLET ORAL at 08:06

## 2021-08-22 RX ADMIN — GABAPENTIN 600 MG: 300 CAPSULE ORAL at 21:16

## 2021-08-22 RX ADMIN — ONDANSETRON 4 MG: 2 INJECTION INTRAMUSCULAR; INTRAVENOUS at 13:10

## 2021-08-22 RX ADMIN — LEVOTHYROXINE SODIUM 75 MCG: 0.07 TABLET ORAL at 08:07

## 2021-08-22 RX ADMIN — METHADONE HYDROCHLORIDE 10 MG: 10 TABLET ORAL at 17:03

## 2021-08-22 NOTE — ROUTINE PROCESS
1925 Bedside and Verbal shift change  Received from Hilaria Chu RN (outgoing nurse), to JUNIOR Manrique (oncoming)  Pt. Is AOX 4. IV SL, Pt. denies  pain at this time. Report included the following information SBAR, Kardex, Procedure Summary, Intake/Output, MAR, Recent Lab Results, and  Cardiac Rhythm @ SR. Will resume care and monitor Pt. Condition. Pt. Educated on call bell when in need of help and assistance. Pt. verbalized understanding. Up with assist and walker. 2010  Pt. Head to toe Assessment Done and documented. 2130  Pt. No in distress. 2300  Pt. Denies discomfort. 0030  Pt. Resting in bed, not in distress. 0230  Pt. Resting with eyes closed, easily awaken. 0430  Pt. Self-cath, 1100ml noted. 0630  Pt. Able to rest and sleep well throughout the shift. Verbal and bedside Shift changed report given to Hilaria Chu RN (oncoming RN) on Pt. Condition. Report consisted of patients Situation, History, Activities, intake/output,  Background, Assessment and Recommendations(SBAR). Information from the following report(s) Kardex, order Summary, Lab results and MAR was reviewed with the receiving nurse. Opportunity for questions and clarification was provided.

## 2021-08-22 NOTE — PROGRESS NOTES
6678 - Patient in bed watching tv at this time. A/O x 4. IV to left upper arm  intact and patent. Lovenox is DVT prophylaxis. Patient reports numbness/tinglin to tops of bilateral feet that has been there \"for a few days. \". Pedal pulses palpable. Lungs clear, diminished. . Bowel sounds active to all quadrants. Pain 0/10.

## 2021-08-22 NOTE — PROGRESS NOTES
Problem: Risk for Spread of Infection  Goal: Prevent transmission of infectious organism to others  Description: Prevent the transmission of infectious organisms to other patients, staff members, and visitors. Outcome: Progressing Towards Goal     Problem: Patient Education:  Go to Education Activity  Goal: Patient/Family Education  Outcome: Progressing Towards Goal     Problem: Falls - Risk of  Goal: *Absence of Falls  Description: Document Danette Wilson Fall Risk and appropriate interventions in the flowsheet.   Outcome: Progressing Towards Goal  Note: Fall Risk Interventions:  Mobility Interventions: Patient to call before getting OOB         Medication Interventions: Teach patient to arise slowly    Elimination Interventions: Call light in reach    History of Falls Interventions: Door open when patient unattended         Problem: Patient Education: Go to Patient Education Activity  Goal: Patient/Family Education  Outcome: Progressing Towards Goal

## 2021-08-23 LAB
ALBUMIN SERPL-MCNC: 2.1 G/DL (ref 3.4–5)
ALBUMIN/GLOB SERPL: 0.4 {RATIO} (ref 0.8–1.7)
ALP SERPL-CCNC: 83 U/L (ref 45–117)
ALT SERPL-CCNC: 7 U/L (ref 16–61)
ANION GAP SERPL CALC-SCNC: 5 MMOL/L (ref 3–18)
AST SERPL-CCNC: 17 U/L (ref 10–38)
BACTERIA SPEC CULT: NORMAL
BACTERIA SPEC CULT: NORMAL
BASOPHILS # BLD: 0 K/UL (ref 0–0.1)
BASOPHILS NFR BLD: 1 % (ref 0–2)
BILIRUB SERPL-MCNC: 0.1 MG/DL (ref 0.2–1)
BUN SERPL-MCNC: 9 MG/DL (ref 7–18)
BUN/CREAT SERPL: 14 (ref 12–20)
CALCIUM SERPL-MCNC: 8.5 MG/DL (ref 8.5–10.1)
CHLORIDE SERPL-SCNC: 102 MMOL/L (ref 100–111)
CO2 SERPL-SCNC: 30 MMOL/L (ref 21–32)
CREAT SERPL-MCNC: 0.63 MG/DL (ref 0.6–1.3)
DIFFERENTIAL METHOD BLD: ABNORMAL
EOSINOPHIL # BLD: 0.1 K/UL (ref 0–0.4)
EOSINOPHIL NFR BLD: 3 % (ref 0–5)
ERYTHROCYTE [DISTWIDTH] IN BLOOD BY AUTOMATED COUNT: 17.9 % (ref 11.6–14.5)
GLOBULIN SER CALC-MCNC: 5.3 G/DL (ref 2–4)
GLUCOSE SERPL-MCNC: 87 MG/DL (ref 74–99)
HCT VFR BLD AUTO: 29.7 % (ref 36–48)
HGB BLD-MCNC: 8.9 G/DL (ref 13–16)
LYMPHOCYTES # BLD: 1.7 K/UL (ref 0.9–3.6)
LYMPHOCYTES NFR BLD: 49 % (ref 21–52)
MCH RBC QN AUTO: 23.4 PG (ref 24–34)
MCHC RBC AUTO-ENTMCNC: 30 G/DL (ref 31–37)
MCV RBC AUTO: 78 FL (ref 74–97)
MONOCYTES # BLD: 0.3 K/UL (ref 0.05–1.2)
MONOCYTES NFR BLD: 7 % (ref 3–10)
NEUTS SEG # BLD: 1.5 K/UL (ref 1.8–8)
NEUTS SEG NFR BLD: 41 % (ref 40–73)
PLATELET # BLD AUTO: 262 K/UL (ref 135–420)
PMV BLD AUTO: 9.4 FL (ref 9.2–11.8)
POTASSIUM SERPL-SCNC: 4.5 MMOL/L (ref 3.5–5.5)
PROT SERPL-MCNC: 7.4 G/DL (ref 6.4–8.2)
RBC # BLD AUTO: 3.81 M/UL (ref 4.35–5.65)
SERVICE CMNT-IMP: NORMAL
SERVICE CMNT-IMP: NORMAL
SODIUM SERPL-SCNC: 137 MMOL/L (ref 136–145)
WBC # BLD AUTO: 3.6 K/UL (ref 4.6–13.2)

## 2021-08-23 PROCEDURE — 85025 COMPLETE CBC W/AUTO DIFF WBC: CPT

## 2021-08-23 PROCEDURE — 74011250636 HC RX REV CODE- 250/636: Performed by: FAMILY MEDICINE

## 2021-08-23 PROCEDURE — 65270000029 HC RM PRIVATE

## 2021-08-23 PROCEDURE — 74011250636 HC RX REV CODE- 250/636: Performed by: INTERNAL MEDICINE

## 2021-08-23 PROCEDURE — 80053 COMPREHEN METABOLIC PANEL: CPT

## 2021-08-23 PROCEDURE — 36415 COLL VENOUS BLD VENIPUNCTURE: CPT

## 2021-08-23 PROCEDURE — 74011250637 HC RX REV CODE- 250/637: Performed by: FAMILY MEDICINE

## 2021-08-23 PROCEDURE — 74011000250 HC RX REV CODE- 250: Performed by: FAMILY MEDICINE

## 2021-08-23 RX ORDER — VANCOMYCIN HYDROCHLORIDE
1250 EVERY 12 HOURS
Status: DISCONTINUED | OUTPATIENT
Start: 2021-08-24 | End: 2021-08-25 | Stop reason: HOSPADM

## 2021-08-23 RX ORDER — VANCOMYCIN 2 GRAM/500 ML IN 0.9 % SODIUM CHLORIDE INTRAVENOUS
2000 ONCE
Status: COMPLETED | OUTPATIENT
Start: 2021-08-23 | End: 2021-08-23

## 2021-08-23 RX ADMIN — GABAPENTIN 600 MG: 300 CAPSULE ORAL at 18:31

## 2021-08-23 RX ADMIN — ONDANSETRON 4 MG: 2 INJECTION INTRAMUSCULAR; INTRAVENOUS at 15:31

## 2021-08-23 RX ADMIN — BACLOFEN 10 MG: 10 TABLET ORAL at 12:25

## 2021-08-23 RX ADMIN — CEFAZOLIN SODIUM 2 G: 100 INJECTION, POWDER, LYOPHILIZED, FOR SOLUTION INTRAVENOUS at 01:41

## 2021-08-23 RX ADMIN — DOCUSATE SODIUM 100 MG: 100 CAPSULE, LIQUID FILLED ORAL at 22:40

## 2021-08-23 RX ADMIN — DOCUSATE SODIUM 100 MG: 100 CAPSULE, LIQUID FILLED ORAL at 08:42

## 2021-08-23 RX ADMIN — METHADONE HYDROCHLORIDE 10 MG: 10 TABLET ORAL at 22:40

## 2021-08-23 RX ADMIN — ENOXAPARIN SODIUM 40 MG: 40 INJECTION SUBCUTANEOUS at 08:44

## 2021-08-23 RX ADMIN — LEVOTHYROXINE SODIUM 75 MCG: 0.07 TABLET ORAL at 06:30

## 2021-08-23 RX ADMIN — TAMSULOSIN HYDROCHLORIDE 0.4 MG: 0.4 CAPSULE ORAL at 08:42

## 2021-08-23 RX ADMIN — CLONAZEPAM 2 MG: 0.5 TABLET ORAL at 22:39

## 2021-08-23 RX ADMIN — GABAPENTIN 600 MG: 300 CAPSULE ORAL at 22:40

## 2021-08-23 RX ADMIN — VANCOMYCIN HYDROCHLORIDE 2000 MG: 10 INJECTION, POWDER, LYOPHILIZED, FOR SOLUTION INTRAVENOUS at 12:28

## 2021-08-23 RX ADMIN — METHADONE HYDROCHLORIDE 10 MG: 10 TABLET ORAL at 15:27

## 2021-08-23 RX ADMIN — FAMOTIDINE 20 MG: 20 TABLET, FILM COATED ORAL at 22:40

## 2021-08-23 RX ADMIN — BACLOFEN 10 MG: 10 TABLET ORAL at 18:32

## 2021-08-23 RX ADMIN — FAMOTIDINE 20 MG: 20 TABLET, FILM COATED ORAL at 08:42

## 2021-08-23 RX ADMIN — ACETAMINOPHEN 650 MG: 325 TABLET ORAL at 08:42

## 2021-08-23 RX ADMIN — ONDANSETRON 4 MG: 2 INJECTION INTRAMUSCULAR; INTRAVENOUS at 08:54

## 2021-08-23 RX ADMIN — METHADONE HYDROCHLORIDE 10 MG: 10 TABLET ORAL at 06:34

## 2021-08-23 RX ADMIN — BACLOFEN 10 MG: 10 TABLET ORAL at 22:40

## 2021-08-23 RX ADMIN — GABAPENTIN 600 MG: 300 CAPSULE ORAL at 12:25

## 2021-08-23 NOTE — PROGRESS NOTES
0755:Received verbal bedside report from off going nurse LAWRENCE Doyle R.N. Patient care received. Patient alert and oriented x 4. Patient resting in bed denies pain. Patient stable. Call light with in reach bed in lowest position. 9592: Informed nursing supervisor that patient became very lethargic after brother  vi sited yesterday per report and per doctor's note. She said that she would see if patient having visitors should be discontinued. Will continue to monitor patient.
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
## 2021-08-23 NOTE — ROUTINE PROCESS
Bedside and Verbal shift change report given to Tristian Monroe  (oncoming nurse) by Tylor Zaman RN  (offgoing nurse). Report given with SBAR, Kardex, Intake/Output and Recent Results.

## 2021-08-23 NOTE — PROGRESS NOTES
CM sent the following referral to [facility] for transfer consideration. At this time [facility] is on hold for referrals (roughly 1-2 weeks). CM will continue to follow-up and work towards coordinating transfer as soon as possible. Transfer Request Questionnaire:    1) Basic Patient Information: Samreen Covarrubias,  511/71.  53year old male admitted with abscess in the epidural space of thoracic spine requiring long term IV antibiotics    2)   Insurance information: Medicare A/B and     2) Acuity Level: Moderate    3) Patient History: TBI, PTSD, HTN, sleep apnea, GERD, BPH, chronic back pain, intestinal malabsorption, ankle impingement syndrome, DDD, lumbar surgical wound fluid collection, abscess in epidural space, alcohol abuse, IVDU, polysubstance dependence    4) Any isolation requirements: None    5) Ability to complete ADLs: Minimal/moderate assist    6) Any behavior concerns: Patient has been cooperative with staff for the majority of this admission, some foul language documented at the beginning of the admission    7) Medical Reason vs. Non-Medical Reason: Patient is unable to be discharged home with PICC due to history of IVDU    8) Any consulting or specialty MDs involved in case: Infectious disease    9) What barriers exists to keeping the patient in their current location: Hospitalization at this level of care is not indicated for the time needed to complete the IV antibiotics and patient must be monitored with PICC line.     10) Is the patient willing to transfer: Yes    11) Has there been an ymgsmpuuo-gx-gprblskoi conversation.: Not at this time    12) Has there been an RN-to-RN conversation: Not at this time    15) Has there been a -to- conversation: not at this time

## 2021-08-23 NOTE — PROGRESS NOTES
Hospitalist Progress Note    Patient: Karmen Whitaker MRN: [id number]  CSN: [id number]    YOB: 1970  Age: 53 y.o. Sex: male    DOA: 8/17/2021 LOS:  LOS: 4 days            Chief complaint :  Epidural abscess, bacteremia    Assessment/Plan     Patient Active Problem List   Diagnosis Code    Traumatic brain injury ([facility] [address] S06. 9X5A    PTSD (post-traumatic stress disorder) F43.10    Thyroid disease E07.9    Morbid obesity (HCC) E66.01    Hypertension I10    Hypercholesterolemia E78.00    Sleep apnea G47.30    GERD (gastroesophageal reflux disease) K21.9    Migraine G43.909    BPH (benign prostatic hypertrophy) N40.0    Chronic back pain M54.9, G89.29    Intestinal malabsorption K90.9    Ankle impingement syndrome M25.879    Ankle instability M25.373    Peroneal tenosynovitis M65.9    DDD (degenerative disc disease), lumbar M51.36    Lumbar surgical wound fluid collection T81.89XA    MRSA (methicillin resistant Staphylococcus aureus) infection A49.02    Abscess in epidural space of thoracic spine G06.1    Alcohol abuse F10.10    IVDU (intravenous drug user) F19.90    Polysubstance dependence including opioid type drug with complication, episodic abuse [facility]) F19.20          Karmen Whitaker is a 53 y.o. male has a past history of depression, ADHD,  DVT, Hepatitis C, HTN, cellulitis with MRSA infection of left leg, TBI, HLD, anxiety, and PTSD and recent incomplete treatment for epidural abscess and bacteremia. Admitted to resume treatment for epidural abscess and bacteremia. Per RN, patient was awake and alert, but became more lethargic after brother visited. He is not able to keep himself awake for discussion.  Falling asleep after each question.      Bacterial spinal epidural abscess -  Ordered IV cefazolin   ID following.     Bacteremia -  Follow up blood cultures      Polysubstance abuse and dependence -  UDS positive for amphetamines, cocaine, methadone and opiates     H/o alcohol abuse -  CIWA protocol   Banana bag     Hypertension -  Resume home medications      H/o TBI and PTSD -  Resume home meds  Supportive care     DVT prophylaxis: Lovenox  GI prophylaxis: pepcid      Disposition : 2-3 days    Review of systems  General: No fevers or chills. Cardiovascular: No chest pain or pressure. No palpitations. Pulmonary: No shortness of breath. Gastrointestinal: No nausea, vomiting. Physical Exam:  General: As above  HEENT: NC, Atraumatic. PERRLA, anicteric sclerae. Lungs: CTA Bilaterally. No Wheezing/Rhonchi/Rales. Heart:  S1 S2,  No murmur, No Rubs, No Gallops  Abdomen: Soft, Non distended, Non tender.  +Bowel sounds,   Extremities: No c/c/e  Psych:   Not anxious or agitated. Neurologic:  Not able to test due to patient being somnolent. Open Wound left calf      Vital signs/Intake and Output:  Visit Vitals  /66 (BP 1 Location: Right upper arm, BP Patient Position: At rest)   Pulse 76   Temp 97.6 °F (36.4 °C)   Resp 14   Ht 6' (1.829 m)   Wt 87.9 kg (193 lb 12.6 oz)   SpO2 100%   BMI 26.28 kg/m²     Current Shift:  08/22 1901 - 08/23 0700  In: -   Out: 400 [Urine:400]  Last three shifts:  08/21 0701 - 08/22 1900  In: 640 [P.O.:640]  Out: 1700 [Urine:1700]            Labs: Results:       Chemistry Recent Labs     08/21/21  0308 08/20/21  0436   GLU 87 93    136   K 4.6 4.5    103   CO2 31 30   BUN 8 8   CREA 0.50* 0.50*   CA 8.2* 8.3*   AGAP 3 3   BUCR 16 16      CBC w/Diff Recent Labs     08/21/21  0308 08/20/21  1215 08/20/21  0436   WBC 2.4* 2.5* 1.8*   RBC 3.47* 3.64* 3.25*   HGB 8.0* 8.6* 7.6*   HCT 26.7* 28.9* 25.4*    288 268   GRANS 46 48  --    LYMPH 44 41  --    EOS 1 1  --       Cardiac Enzymes No results for input(s): CPK, CKND1, DICKSON in the last 72 hours. No lab exists for component: CKRMB, TROIP   Coagulation No results for input(s): PTP, INR, APTT, INREXT, INREXT in the last 72 hours.     Lipid Panel No results found for: CHOL, CHOLPOCT, CHOLX, CHLST, [address], [id number], HDL, HDLP, LDL, LDLC, DLDLP, [id number], VLDLC, VLDL, TGLX, TRIGL, TRIGP, TGLPOCT, CHHD, CHHDX   BNP No results for input(s): BNPP in the last 72 hours. Liver Enzymes No results for input(s): TP, ALB, TBIL, AP in the last 72 hours.     No lab exists for component: SGOT, GPT, DBIL   Thyroid Studies No results found for: T4, T3U, TSH, TSHEXT, TSHEXT     Procedures/imaging: see electronic medical records for all procedures/Xrays and details which were not copied into this note but were reviewed prior to creation of Plan

## 2021-08-23 NOTE — PROGRESS NOTES
Problem: Risk for Spread of Infection  Goal: Prevent transmission of infectious organism to others  Description: Prevent the transmission of infectious organisms to other patients, staff members, and visitors. Outcome: Progressing Towards Goal     Problem: Patient Education:  Go to Education Activity  Goal: Patient/Family Education  Outcome: Progressing Towards Goal     Problem: Falls - Risk of  Goal: *Absence of Falls  Description: Document Jacinta Erazo Fall Risk and appropriate interventions in the flowsheet.   Outcome: Progressing Towards Goal  Note: Fall Risk Interventions:  Mobility Interventions: Bed/chair exit alarm, Patient to call before getting OOB, PT Consult for mobility concerns, PT Consult for assist device competence, Strengthening exercises (ROM-active/passive), Utilize walker, cane, or other assistive device    Mentation Interventions: Adequate sleep, hydration, pain control    Medication Interventions: Patient to call before getting OOB, Teach patient to arise slowly    Elimination Interventions: Call light in reach, Elevated toilet seat, Patient to call for help with toileting needs, Stay With Me (per policy), Toilet paper/wipes in reach, Toileting schedule/hourly rounds    History of Falls Interventions: Door open when patient unattended, Bed/chair exit alarm         Problem: Patient Education: Go to Patient Education Activity  Goal: Patient/Family Education  Outcome: Progressing Towards Goal
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
## 2021-08-23 NOTE — PROGRESS NOTES
Pharmacy Dosing Services: Vanocmycin    Consult for Vancomycin Dosing by Pharmacy by Dr. Kiet Burr  Indication:Bloodstream Infection + Bone/Joint Infection  Day of Therapy 1    Serum Creatinine Lab Results   Component Value Date/Time    Creatinine 0.63 08/23/2021 06:28 AM      Creatinine Clearance Estimated Creatinine Clearance: 140.1 mL/min (by C-G formula based on SCr of 0.63 mg/dL). BUN Lab Results   Component Value Date/Time    BUN 9 08/23/2021 06:28 AM       WBC Lab Results   Component Value Date/Time    WBC 3.6 (L) 08/23/2021 06:27 AM      H/H    Lab Results   Component Value Date/Time    HGB 8.9 (L) 08/23/2021 06:27 AM      Platelets Lab Results   Component Value Date/Time    PLATELET 462 17/19/2716 06:27 AM      Temp 97.3 °F (36.3 °C)     Vancomycin 2g IV X1 Loading dose administered today at 1228. Vancomycin 1250mg IV Q12H maintenance doses to begin 08/24/21 0100. Random level scheduled to be drawn 08/24/21 with AM labs. Pharmacy will continue to monitor daily and make adjustments based on clinical status.      Cata Hamlin, PharmD  [phone]

## 2021-08-23 NOTE — PROGRESS NOTES
[id number] Shift Summary: Pt rested very well overnight with no complaints. Spoke to Dr Parikh Fuelaaliyah about concerns that the patient may be taking drugs from outside sources and requested narcan prn to have on hand. The patient was arousable but would quickly fall back asleep.      Nightshift Chart Audit Completed

## 2021-08-23 NOTE — WOUND CARE
08/23/21 1719   Wound Calf Left;Posterior 08/22/21   Date First Assessed/Time First Assessed: 08/22/21 1200   Wound Approximate Age at First Assessment (Weeks): (c)   Location: Calf  Wound Location Orientation: Left;Posterior  Date of First Observation: 08/22/21   Wound Image    Wound Etiology Other (Comment)  (patient picked the area, per patient)   Dressing Status Clean;Dry; Intact;Breakthrough drainage noted   Cleansed Wound cleanser;Irrigated with saline   Dressing/Treatment Silicone border   Wound Length (cm) 2 cm   Wound Width (cm) 3 cm   Wound Depth (cm) 0.1 cm   Wound Surface Area (cm^2) 6 cm^2   Wound Volume (cm^3) 0.6 cm^3   Wound Assessment Granulation tissue   Drainage Amount Scant   Drainage Description Serous   Wound Odor None   Brook-Wound/Incision Assessment Blanchable erythema   Edges Defined edges   Wound Thickness Description Partial thickness

## 2021-08-23 NOTE — ROUTINE PROCESS
Bedside and Verbal shift change report given to DARVIN Moran (oncoming nurse) by ASHLEY Jean Baptiste R.N. (offgoing nurse). Report included the following information SBAR, Kardex, Intake/Output, MAR, Accordion, Recent Results, and Med Rec Status.

## 2021-08-23 NOTE — PROGRESS NOTES
Care Management    Discharge Planning: SNF/rehab vs [facility] for long term IV antibiotics    CM spoke with the [place] this morning and sent the patient's clinicals to them as the patient service connected. The patient's referral has been sent to all of the local SNFs that contract with the [place] and this CM has received permission from the patient to send his referral to [facility] in the event that they can accept the patient for continued IV therapy. CM to follow the patient's progress and be available to assist with discharge planning as needed. CMS referral placed. Care Management Interventions  PCP Verified by CM: Yes (needs one, CMS to arrange)  Mode of Transport at Discharge:  Other (see comment) (family/girlfriend)  Transition of Care Consult (CM Consult): Discharge Planning  Current Support Network: Own Home (with roommates)  Confirm Follow Up Transport: Family  The Plan for Transition of Care is Related to the Following Treatment Goals : Abscess in epidural space of thoracic spine  Discharge Location  Discharge Placement: [facility] (vs another inpatient facility for IV antibiotics)

## 2021-08-23 NOTE — PROGRESS NOTES
Problem: Risk for Spread of Infection  Goal: Prevent transmission of infectious organism to others  Description: Prevent the transmission of infectious organisms to other patients, staff members, and visitors. Outcome: Progressing Towards Goal     Problem: Patient Education:  Go to Education Activity  Goal: Patient/Family Education  Outcome: Progressing Towards Goal     Problem: Falls - Risk of  Goal: *Absence of Falls  Description: Document Rohini Angela Fall Risk and appropriate interventions in the flowsheet.   Outcome: Progressing Towards Goal  Note: Fall Risk Interventions:  Mobility Interventions: Communicate number of staff needed for ambulation/transfer, OT consult for ADLs, Patient to call before getting OOB, PT Consult for mobility concerns, PT Consult for assist device competence    Mentation Interventions: Door open when patient unattended, More frequent rounding    Medication Interventions: Patient to call before getting OOB, Teach patient to arise slowly    Elimination Interventions: Call light in reach, Patient to call for help with toileting needs    History of Falls Interventions: Door open when patient unattended, Bed/chair exit alarm         Problem: Patient Education: Go to Patient Education Activity  Goal: Patient/Family Education  Outcome: Progressing Towards Goal

## 2021-08-24 LAB
ALBUMIN SERPL-MCNC: 2.3 G/DL (ref 3.4–5)
ALBUMIN/GLOB SERPL: 0.4 {RATIO} (ref 0.8–1.7)
ALP SERPL-CCNC: 84 U/L (ref 45–117)
ALT SERPL-CCNC: 8 U/L (ref 16–61)
ANION GAP SERPL CALC-SCNC: 4 MMOL/L (ref 3–18)
AST SERPL-CCNC: 18 U/L (ref 10–38)
BILIRUB SERPL-MCNC: 0.1 MG/DL (ref 0.2–1)
BUN SERPL-MCNC: 8 MG/DL (ref 7–18)
BUN/CREAT SERPL: 12 (ref 12–20)
CALCIUM SERPL-MCNC: 8.3 MG/DL (ref 8.5–10.1)
CHLORIDE SERPL-SCNC: 102 MMOL/L (ref 100–111)
CO2 SERPL-SCNC: 31 MMOL/L (ref 21–32)
CREAT SERPL-MCNC: 0.68 MG/DL (ref 0.6–1.3)
CRP SERPL-MCNC: 1.3 MG/DL (ref 0–0.3)
ERYTHROCYTE [SEDIMENTATION RATE] IN BLOOD: 69 MM/HR (ref 0–15)
GLOBULIN SER CALC-MCNC: 5.4 G/DL (ref 2–4)
GLUCOSE SERPL-MCNC: 101 MG/DL (ref 74–99)
HBV SURFACE AB SER QL IA: POSITIVE
HBV SURFACE AB SERPL IA-ACNC: 809.32 MIU/ML
HEP BS AB COMMENT,HBSAC: NORMAL
POTASSIUM SERPL-SCNC: 4.1 MMOL/L (ref 3.5–5.5)
PROT SERPL-MCNC: 7.7 G/DL (ref 6.4–8.2)
SODIUM SERPL-SCNC: 137 MMOL/L (ref 136–145)
VANCOMYCIN SERPL-MCNC: 20.2 UG/ML (ref 5–40)

## 2021-08-24 PROCEDURE — 86140 C-REACTIVE PROTEIN: CPT

## 2021-08-24 PROCEDURE — 80202 ASSAY OF VANCOMYCIN: CPT

## 2021-08-24 PROCEDURE — 74011250636 HC RX REV CODE- 250/636: Performed by: INTERNAL MEDICINE

## 2021-08-24 PROCEDURE — 74011250636 HC RX REV CODE- 250/636: Performed by: FAMILY MEDICINE

## 2021-08-24 PROCEDURE — 86706 HEP B SURFACE ANTIBODY: CPT

## 2021-08-24 PROCEDURE — 36415 COLL VENOUS BLD VENIPUNCTURE: CPT

## 2021-08-24 PROCEDURE — 85652 RBC SED RATE AUTOMATED: CPT

## 2021-08-24 PROCEDURE — 77030019905 HC CATH URETH INTMIT MDII -A

## 2021-08-24 PROCEDURE — 80053 COMPREHEN METABOLIC PANEL: CPT

## 2021-08-24 PROCEDURE — 74011250637 HC RX REV CODE- 250/637: Performed by: FAMILY MEDICINE

## 2021-08-24 PROCEDURE — 65270000029 HC RM PRIVATE

## 2021-08-24 RX ADMIN — VANCOMYCIN HYDROCHLORIDE 1250 MG: 10 INJECTION, POWDER, LYOPHILIZED, FOR SOLUTION INTRAVENOUS at 13:31

## 2021-08-24 RX ADMIN — GABAPENTIN 600 MG: 300 CAPSULE ORAL at 16:27

## 2021-08-24 RX ADMIN — CLONAZEPAM 2 MG: 0.5 TABLET ORAL at 21:38

## 2021-08-24 RX ADMIN — VANCOMYCIN HYDROCHLORIDE 1250 MG: 10 INJECTION, POWDER, LYOPHILIZED, FOR SOLUTION INTRAVENOUS at 01:31

## 2021-08-24 RX ADMIN — BACLOFEN 10 MG: 10 TABLET ORAL at 21:40

## 2021-08-24 RX ADMIN — ACETAMINOPHEN 650 MG: 325 TABLET ORAL at 16:27

## 2021-08-24 RX ADMIN — METHADONE HYDROCHLORIDE 10 MG: 10 TABLET ORAL at 21:41

## 2021-08-24 RX ADMIN — GABAPENTIN 600 MG: 300 CAPSULE ORAL at 21:41

## 2021-08-24 RX ADMIN — LEVOTHYROXINE SODIUM 75 MCG: 0.07 TABLET ORAL at 06:36

## 2021-08-24 RX ADMIN — ONDANSETRON 4 MG: 2 INJECTION INTRAMUSCULAR; INTRAVENOUS at 05:45

## 2021-08-24 RX ADMIN — DOCUSATE SODIUM 100 MG: 100 CAPSULE, LIQUID FILLED ORAL at 21:00

## 2021-08-24 RX ADMIN — FAMOTIDINE 20 MG: 20 TABLET, FILM COATED ORAL at 21:38

## 2021-08-24 RX ADMIN — ENOXAPARIN SODIUM 40 MG: 40 INJECTION SUBCUTANEOUS at 08:56

## 2021-08-24 RX ADMIN — METHADONE HYDROCHLORIDE 10 MG: 10 TABLET ORAL at 13:33

## 2021-08-24 RX ADMIN — BACLOFEN 10 MG: 10 TABLET ORAL at 16:27

## 2021-08-24 NOTE — PROGRESS NOTES
0715 : assumed care of patient from [facility] 82 : attempted to give pt morning meds, patient arousable to voice but lethargic. Pt falls back asleep very easily. Pt chest is rising and falling evenly, Morning meds held due to patient not being awake enough to take medications  1218 : patient alert, requesting self cath kit. Patient ambulating to bathroom. 1300 : pt called and complained of IV site burning, pt has midline. Line gets blood return, 3mL of blood and small amount of clear liquid wasted before flushing with 10mL of NS.   1333 : patient sitting up in bed attempting to eat lunch. Patient given methadone. 1627 : patient requesting pain medication, tylenol given. See MAR for administration. 1915 : Bedside shift change report given to [address] (oncoming nurse) by Willie Whitaker RN (offgoing nurse). Report included the following information SBAR, Kardex, Intake/Output and MAR.

## 2021-08-24 NOTE — PROGRESS NOTES
Problem: Risk for Spread of Infection  Goal: Prevent transmission of infectious organism to others  Description: Prevent the transmission of infectious organisms to other patients, staff members, and visitors. Outcome: Progressing Towards Goal     Problem: Falls - Risk of  Goal: *Absence of Falls  Description: Document Rohini Angela Fall Risk and appropriate interventions in the flowsheet.   Outcome: Progressing Towards Goal  Note: Fall Risk Interventions:  Mobility Interventions: Communicate number of staff needed for ambulation/transfer, Patient to call before getting OOB, PT Consult for mobility concerns, Utilize walker, cane, or other assistive device    Mentation Interventions: Adequate sleep, hydration, pain control, Door open when patient unattended, Evaluate medications/consider consulting pharmacy, More frequent rounding, Reorient patient, Room close to nurse's station, Toileting rounds, Update white board    Medication Interventions: Evaluate medications/consider consulting pharmacy, Patient to call before getting OOB, Teach patient to arise slowly    Elimination Interventions: Call light in reach, Patient to call for help with toileting needs, Stay With Me (per policy), Toilet paper/wipes in reach, Toileting schedule/hourly rounds    History of Falls Interventions: Consult care management for discharge planning, Door open when patient unattended, Evaluate medications/consider consulting pharmacy, Investigate reason for fall, Room close to nurse's station

## 2021-08-24 NOTE — PROGRESS NOTES
Problem: Risk for Spread of Infection  Goal: Prevent transmission of infectious organism to others  Description: Prevent the transmission of infectious organisms to other patients, staff members, and visitors. Outcome: Progressing Towards Goal     Problem: Falls - Risk of  Goal: *Absence of Falls  Description: Document Ramonita Spare Fall Risk and appropriate interventions in the flowsheet. Outcome: Progressing Towards Goal  Note: Fall Risk Interventions:  Mobility Interventions: Assess mobility with egress test, PT Consult for mobility concerns    Mentation Interventions: Increase mobility, More frequent rounding    Medication Interventions: Teach patient to arise slowly, Patient to call before getting OOB, Bed/chair exit alarm    Elimination Interventions: Bed/chair exit alarm, Call light in reach    History of Falls Interventions:  Investigate reason for fall
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
## 2021-08-24 NOTE — PROGRESS NOTES
1933 Pt complaining of feeling dizzy all day. Bp was 125/60. Will notify  Provider. 2100 Pt states that his dizziness is subsiding. Just finished eating meal.     0720 Bedside and Verbal shift change report given to M. Deanna Ganser (oncoming nurse) by Tahira Parkinson RN   (offgoing nurse). Report included the following information SBAR, Kardex, Procedure Summary, Intake/Output, MAR, Recent Results and Med Rec Status.

## 2021-08-25 VITALS
DIASTOLIC BLOOD PRESSURE: 88 MMHG | WEIGHT: 193 LBS | HEART RATE: 77 BPM | TEMPERATURE: 98 F | BODY MASS INDEX: 26.14 KG/M2 | OXYGEN SATURATION: 100 % | RESPIRATION RATE: 16 BRPM | SYSTOLIC BLOOD PRESSURE: 141 MMHG | HEIGHT: 72 IN

## 2021-08-25 LAB
ALBUMIN SERPL-MCNC: 2.2 G/DL (ref 3.4–5)
ALBUMIN/GLOB SERPL: 0.5 {RATIO} (ref 0.8–1.7)
ALP SERPL-CCNC: 78 U/L (ref 45–117)
ALT SERPL-CCNC: 9 U/L (ref 16–61)
ANION GAP SERPL CALC-SCNC: 6 MMOL/L (ref 3–18)
AST SERPL-CCNC: 16 U/L (ref 10–38)
BASOPHILS # BLD: 0 K/UL (ref 0–0.1)
BASOPHILS NFR BLD: 0 % (ref 0–2)
BILIRUB SERPL-MCNC: 0.1 MG/DL (ref 0.2–1)
BUN SERPL-MCNC: 9 MG/DL (ref 7–18)
BUN/CREAT SERPL: 13 (ref 12–20)
CALCIUM SERPL-MCNC: 8.3 MG/DL (ref 8.5–10.1)
CHLORIDE SERPL-SCNC: 102 MMOL/L (ref 100–111)
CO2 SERPL-SCNC: 30 MMOL/L (ref 21–32)
CREAT SERPL-MCNC: 0.72 MG/DL (ref 0.6–1.3)
DIFFERENTIAL METHOD BLD: ABNORMAL
EOSINOPHIL # BLD: 0.1 K/UL (ref 0–0.4)
EOSINOPHIL NFR BLD: 2 % (ref 0–5)
ERYTHROCYTE [DISTWIDTH] IN BLOOD BY AUTOMATED COUNT: 17.6 % (ref 11.6–14.5)
GLOBULIN SER CALC-MCNC: 4.8 G/DL (ref 2–4)
GLUCOSE SERPL-MCNC: 134 MG/DL (ref 74–99)
HCT VFR BLD AUTO: 29.8 % (ref 36–48)
HCV RNA SERPL NAA+PROBE-ACNC: NORMAL IU/ML
HGB BLD-MCNC: 8.8 G/DL (ref 13–16)
LYMPHOCYTES # BLD: 1.4 K/UL (ref 0.9–3.6)
LYMPHOCYTES NFR BLD: 45 % (ref 21–52)
MCH RBC QN AUTO: 23.2 PG (ref 24–34)
MCHC RBC AUTO-ENTMCNC: 29.5 G/DL (ref 31–37)
MCV RBC AUTO: 78.6 FL (ref 78–100)
MONOCYTES # BLD: 0.2 K/UL (ref 0.05–1.2)
MONOCYTES NFR BLD: 6 % (ref 3–10)
NEUTS SEG # BLD: 1.4 K/UL (ref 1.8–8)
NEUTS SEG NFR BLD: 47 % (ref 40–73)
PLATELET # BLD AUTO: 242 K/UL (ref 135–420)
PMV BLD AUTO: 9.6 FL (ref 9.2–11.8)
POTASSIUM SERPL-SCNC: 4.1 MMOL/L (ref 3.5–5.5)
PROT SERPL-MCNC: 7 G/DL (ref 6.4–8.2)
RBC # BLD AUTO: 3.79 M/UL (ref 4.35–5.65)
SERIAL MONITORING: NORMAL
SODIUM SERPL-SCNC: 138 MMOL/L (ref 136–145)
WBC # BLD AUTO: 3.1 K/UL (ref 4.6–13.2)

## 2021-08-25 PROCEDURE — 74011250637 HC RX REV CODE- 250/637: Performed by: FAMILY MEDICINE

## 2021-08-25 PROCEDURE — 74011250636 HC RX REV CODE- 250/636: Performed by: FAMILY MEDICINE

## 2021-08-25 PROCEDURE — 74011250636 HC RX REV CODE- 250/636: Performed by: INTERNAL MEDICINE

## 2021-08-25 PROCEDURE — 36415 COLL VENOUS BLD VENIPUNCTURE: CPT

## 2021-08-25 PROCEDURE — 80053 COMPREHEN METABOLIC PANEL: CPT

## 2021-08-25 PROCEDURE — 85025 COMPLETE CBC W/AUTO DIFF WBC: CPT

## 2021-08-25 RX ADMIN — TAMSULOSIN HYDROCHLORIDE 0.4 MG: 0.4 CAPSULE ORAL at 09:58

## 2021-08-25 RX ADMIN — BACLOFEN 10 MG: 10 TABLET ORAL at 09:59

## 2021-08-25 RX ADMIN — ONDANSETRON 4 MG: 2 INJECTION INTRAMUSCULAR; INTRAVENOUS at 01:07

## 2021-08-25 RX ADMIN — METHADONE HYDROCHLORIDE 10 MG: 10 TABLET ORAL at 09:58

## 2021-08-25 RX ADMIN — GABAPENTIN 600 MG: 300 CAPSULE ORAL at 09:59

## 2021-08-25 RX ADMIN — VANCOMYCIN HYDROCHLORIDE 1250 MG: 10 INJECTION, POWDER, LYOPHILIZED, FOR SOLUTION INTRAVENOUS at 01:07

## 2021-08-25 RX ADMIN — DOCUSATE SODIUM 100 MG: 100 CAPSULE, LIQUID FILLED ORAL at 09:58

## 2021-08-25 RX ADMIN — ENOXAPARIN SODIUM 40 MG: 40 INJECTION SUBCUTANEOUS at 09:58

## 2021-08-25 RX ADMIN — LEVOTHYROXINE SODIUM 75 MCG: 0.07 TABLET ORAL at 09:59

## 2021-08-25 RX ADMIN — FAMOTIDINE 20 MG: 20 TABLET, FILM COATED ORAL at 09:59

## 2021-08-25 NOTE — PROGRESS NOTES
Problem: Risk for Spread of Infection  Goal: Prevent transmission of infectious organism to others  Description: Prevent the transmission of infectious organisms to other patients, staff members, and visitors. Outcome: Progressing Towards Goal     Problem: Patient Education:  Go to Education Activity  Goal: Patient/Family Education  Outcome: Progressing Towards Goal     Problem: Falls - Risk of  Goal: *Absence of Falls  Description: Document Charlene Beyer Fall Risk and appropriate interventions in the flowsheet. Outcome: Progressing Towards Goal  Note: Fall Risk Interventions:  Mobility Interventions: Assess mobility with egress test, PT Consult for mobility concerns    Mentation Interventions: Increase mobility, More frequent rounding    Medication Interventions: Teach patient to arise slowly, Patient to call before getting OOB, Bed/chair exit alarm    Elimination Interventions: Bed/chair exit alarm, Call light in reach    History of Falls Interventions: Investigate reason for fall         Problem: Patient Education: Go to Patient Education Activity  Goal: Patient/Family Education  Outcome: Progressing Towards Goal     Problem: Pain  Goal: *Control of Pain  Outcome: Progressing Towards Goal  Goal: *PALLIATIVE CARE:  Alleviation of Pain  Outcome: Progressing Towards Goal     Problem: Patient Education: Go to Patient Education Activity  Goal: Patient/Family Education  Outcome: Progressing Towards Goal     Problem: Pressure Injury - Risk of  Goal: *Prevention of pressure injury  Description: Document Luis Scale and appropriate interventions in the flowsheet.   Outcome: Progressing Towards Goal  Note: Pressure Injury Interventions:  Sensory Interventions: Assess changes in LOC    Moisture Interventions: Absorbent underpads, Limit adult briefs    Activity Interventions: PT/OT evaluation    Mobility Interventions: PT/OT evaluation    Nutrition Interventions: Document food/fluid/supplement intake    Friction and Shear Interventions: Apply protective barrier, creams and emollients, Minimize layers                Problem: Patient Education: Go to Patient Education Activity  Goal: Patient/Family Education  Outcome: Progressing Towards Goal

## 2021-08-25 NOTE — PROGRESS NOTES
Comprehensive Nutrition Assessment    Type and Reason for Visit: Initial, RD nutrition re-screen/LOS    Nutrition Recommendations/Plan: Supplement: will order Ensure Enlive BID    Nutrition Assessment:  PMHx: DVT, hepatitis C, htn, cellulitis with VICTOR of left leg, HLD, IVDA with heroin and cocaine, alcohol use. Admitted to resume treatment for epidural abscess and bacteremia, Polysubstance abuse and dependence -UDS positive, banana bag for h/o etoh use. Estimated Daily Nutrient Needs:  Energy (kcal): 2040-5564; Weight Used for Energy Requirements: Current  Protein (g): 88; Weight Used for Protein Requirements: Current (1g)  Fluid (ml/day): 0861-3705; Method Used for Fluid Requirements: 1 ml/kcal    Nutrition Related Findings:  Med: flomax, synthroid, pepcid, colace. No BM noted since admission. PO intake of past two days seems to be poor per nursing documentation of 1-25%. Attempted to call patient room but no answer. Will continue to monitor PO intake. Wounds:     (patient picked the area of calf per documentation)       Current Nutrition Therapies:  ADULT DIET Regular    Anthropometric Measures:  · Height:  6' (182.9 cm)  · Current Body Wt:  87.5 kg (192 lb 14.4 oz)   · Admission Body Wt:  197 lb    · Usual Body Wt:  79.4 kg (175 lb) (8/2/2021)     · Ideal Body Wt:  178 lbs:  108.4 %   · BMI Category: Overweight (BMI 25.0-29. 9)       Nutrition Diagnosis:   · Inadequate energy intake related to acute injury/trauma as evidenced by intake 0-25%    Nutrition Interventions:   Food and/or Nutrient Delivery: Continue current diet, Start oral nutrition supplement  Nutrition Education and Counseling: No recommendations at this time  Coordination of Nutrition Care: Continue to monitor while inpatient    Goals:  Po intake >75% at most meals throughout the next 5-7 days       Nutrition Monitoring and Evaluation:   Behavioral-Environmental Outcomes: None identified  Food/Nutrient Intake Outcomes: Food and nutrient intake  Physical Signs/Symptoms Outcomes: Biochemical data, Meal time behavior, Skin, Weight, GI status    Discharge Planning:    Continue current diet     Electronically signed by Shonda Anna RD on 8/25/2021 at 10:25 AM

## 2021-08-25 NOTE — PROGRESS NOTES
Hospitalist Progress Note    Patient: Juanita Monteiro MRN: [id number]  CSN: [id number]    YOB: 1970  Age: 53 y.o. Sex: male    DOA: 8/17/2021 LOS:  LOS: 6 days            Chief complaint :  Epidural abscess, bacteremia    Assessment/Plan     Patient Active Problem List   Diagnosis Code    Traumatic brain injury ([facility] [address] S06. 9X5A    PTSD (post-traumatic stress disorder) F43.10    Thyroid disease E07.9    Morbid obesity (HCC) E66.01    Hypertension I10    Hypercholesterolemia E78.00    Sleep apnea G47.30    GERD (gastroesophageal reflux disease) K21.9    Migraine G43.909    BPH (benign prostatic hypertrophy) N40.0    Chronic back pain M54.9, G89.29    Intestinal malabsorption K90.9    Ankle impingement syndrome M25.879    Ankle instability M25.373    Peroneal tenosynovitis M65.9    DDD (degenerative disc disease), lumbar M51.36    Lumbar surgical wound fluid collection T81.89XA    MRSA (methicillin resistant Staphylococcus aureus) infection A49.02    Abscess in epidural space of thoracic spine G06.1    Alcohol abuse F10.10    IVDU (intravenous drug user) F19.90    Polysubstance dependence including opioid type drug with complication, episodic abuse [facility]) F19.20          Juanita Monteiro is a 53 y.o. male has a past history of depression, ADHD,  DVT, Hepatitis C, HTN, cellulitis with MRSA infection of left leg, TBI, HLD, anxiety, and PTSD and recent incomplete treatment for epidural abscess and bacteremia. Admitted to resume treatment for epidural abscess and bacteremia. Per RN, patient was awake and alert, but became more lethargic after brother visited. He is not able to keep himself awake for discussion.  Falling asleep after each question.      Bacterial spinal epidural abscess -  Ordered IV cefazolin   ID following.     Bacteremia -  Follow up blood cultures      Polysubstance abuse and dependence -  UDS positive for amphetamines, cocaine, methadone and opiates     H/o alcohol abuse -  CIWA protocol   Banana bag     Hypertension -  Resume home medications      H/o TBI and PTSD -  Resume home meds  Supportive care     DVT prophylaxis: Lovenox  GI prophylaxis: pepcid      Disposition : 2-3 days    Review of systems  General: No fevers or chills. Cardiovascular: No chest pain or pressure. No palpitations. Pulmonary: No shortness of breath. Gastrointestinal: No nausea, vomiting. Physical Exam:  General: As above  HEENT: NC, Atraumatic. PERRLA, anicteric sclerae. Lungs: CTA Bilaterally. No Wheezing/Rhonchi/Rales. Heart:  S1 S2,  No murmur, No Rubs, No Gallops  Abdomen: Soft, Non distended, Non tender.  +Bowel sounds,   Extremities: No c/c/e  Psych:   Not anxious or agitated. Neurologic:  Not able to test due to patient being somnolent. Open Wound left calf      Vital signs/Intake and Output:  Visit Vitals  BP (!) 172/70   Pulse 63   Temp 97.7 °F (36.5 °C)   Resp 16   Ht 6' (1.829 m)   Wt 87.5 kg (193 lb)   SpO2 100%   BMI 26.18 kg/m²     Current Shift:  No intake/output data recorded. Last three shifts:  08/23 0701 - 08/24 1900  In: 240 [P.O.:240]  Out: 1800 [Urine:1800]            Labs: Results:       Chemistry Recent Labs     08/24/21  0640 08/23/21  0628   * 87    137   K 4.1 4.5    102   CO2 31 30   BUN 8 9   CREA 0.68 0.63   CA 8.3* 8.5   AGAP 4 5   BUCR 12 14   AP 84 83   TP 7.7 7.4   ALB 2.3* 2.1*   GLOB 5.4* 5.3*   AGRAT 0.4* 0.4*      CBC w/Diff Recent Labs     08/23/21  0627   WBC 3.6*   RBC 3.81*   HGB 8.9*   HCT 29.7*      GRANS 41   LYMPH 49   EOS 3      Cardiac Enzymes No results for input(s): CPK, CKND1, DICKSON in the last 72 hours. No lab exists for component: CKRMB, TROIP   Coagulation No results for input(s): PTP, INR, APTT, INREXT, INREXT in the last 72 hours.     Lipid Panel No results found for: CHOL, CHOLPOCT, CHOLX, CHLST, CHOLV, 924386, HDL, HDLP, LDL, LDLC, DLDLP, 016957, VLDLC, VLDL, TGLX, TRIGL, TRIGP, TGLPOCT, CHHD, CHHDX BNP No results for input(s): BNPP in the last 72 hours.    Liver Enzymes Recent Labs     08/24/21  0640   TP 7.7   ALB 2.3*   AP 84      Thyroid Studies No results found for: T4, T3U, TSH, TSHEXT, TSHEXT     Procedures/imaging: see electronic medical records for all procedures/Xrays and details which were not copied into this note but were reviewed prior to creation of Plan

## 2021-08-25 NOTE — PROGRESS NOTES
Assumed pt care 0730. Assessment performed, see flowsheet. Pt a&o x4, pt assisted w/toileting. Pt denies pain and sob. Pt bathed w/chg wipes, gown and linens changed. Pt became upset when visitors were declined by staff. Visitors and pt informed pt may not have visitors at this time. Pt asking to speak with charge nurse. Raymond Hudson went to speak w/pt concerning no visitation. Pt states he wants to leave [facility]a. Risks of doing so explained to pt, pt still wants to leave.  notified of pt wanting to leave [facility] and asks pt to wait until he comes. Pt declines. Iv removed, cath intact. Pt signs ama form. Pt changes into clothes, all belongings w/pt. Armbands removed. Pt escorted to lobby, ride not present. Aide waited approx 5-10 min w/pt waiting for ride. Pt remains in lobby waiting for ride a&o.

## 2021-08-25 NOTE — DISCHARGE SUMMARY
Discharge Summary    Patient: Justice Parkinson MRN: [id number]  CSN: [id number]    YOB: 1970  Age: 53 y.o. Sex: male    DOA: 8/17/2021 LOS:  LOS: 7 days   Discharge Date:      Primary Care Provider:  Xavi Amado MD    Admission Diagnoses: Abscess in epidural space of thoracic spine [G06.1]    Discharge Diagnoses:    Problem List as of 8/25/2021 Date Reviewed: 6/13/2017        Codes Class Noted - Resolved    Polysubstance dependence including opioid type drug with complication, episodic abuse ([facility] [address] ICD-10-CM: F19.20  ICD-9-CM: 304.72  8/19/2021 - Present        * (Principal) Abscess in epidural space of thoracic spine ICD-10-CM: G06.1  ICD-9-CM: 324.1  8/18/2021 - Present        Alcohol abuse ICD-10-CM: F10.10  ICD-9-CM: 305.00  8/18/2021 - Present        IVDU (intravenous drug user) ICD-10-CM: F19.90  ICD-9-CM: 305.90  8/18/2021 - Present        Lumbar surgical wound fluid collection ICD-10-CM: T81.89XA  ICD-9-CM: 998.89  6/13/2017 - Present        MRSA (methicillin resistant Staphylococcus aureus) infection ICD-10-CM: A49.02  ICD-9-CM: 041.12  6/13/2017 - Present        DDD (degenerative disc disease), lumbar ICD-10-CM: M51.36  ICD-9-CM: 722.52  5/16/2017 - Present        Ankle impingement syndrome (Chronic) ICD-10-CM: Z14.804  ICD-9-CM: 719.87  2/20/2014 - Present        Ankle instability (Chronic) ICD-10-CM: M25.373  ICD-9-CM: 718.87  2/20/2014 - Present        Peroneal tenosynovitis (Chronic) ICD-10-CM: M65.9  ICD-9-CM: 727.09  2/20/2014 - Present        Intestinal malabsorption ICD-10-CM: K90.9  ICD-9-CM: 579.9  12/10/2013 - Present        Traumatic brain injury ([address] ICD-10-CM: S06. 9X9A  ICD-9-CM: 854.00  Unknown - Present    Overview Signed 10/21/2013  9:11 AM by TONG Almonte     combat related             PTSD (post-traumatic stress disorder) ICD-10-CM: F43.10  ICD-9-CM: 309.81  Unknown - Present        Thyroid disease ICD-10-CM: E07.9  ICD-9-CM: 246. 9  Unknown - Present Morbid obesity (HCC) ICD-10-CM: E66.01  ICD-9-CM: 278.01  Unknown - Present        Hypertension ICD-10-CM: I10  ICD-9-CM: 401.9  Unknown - Present        Hypercholesterolemia ICD-10-CM: E78.00  ICD-9-CM: 272.0  Unknown - Present        Sleep apnea ICD-10-CM: G47.30  ICD-9-CM: 780.57  Unknown - Present        GERD (gastroesophageal reflux disease) ICD-10-CM: K21.9  ICD-9-CM: 530.81  Unknown - Present        Migraine ICD-10-CM: Q21.014  ICD-9-CM: 346.90  Unknown - Present        BPH (benign prostatic hypertrophy) ICD-10-CM: N40.0  ICD-9-CM: 600.00  Unknown - Present        Chronic back pain ICD-10-CM: M54.9, G89.29  ICD-9-CM: 724.5, 338.29  Unknown - Present    Overview Signed 10/21/2013  9:20 AM by TONG Wise     combat related / surgical             RESOLVED: Lumbar foraminal stenosis ICD-10-CM: M48.061  ICD-9-CM: 724.02  5/16/2017 - 5/22/2017        RESOLVED: HNP (herniated nucleus pulposus), lumbar ICD-10-CM: M51.26  ICD-9-CM: 722.10  5/16/2017 - 5/22/2017        RESOLVED: HNP (herniated nucleus pulposus), cervical ICD-10-CM: M50.20  ICD-9-CM: 722.0  1/13/2016 - 1/20/2016              Discharge Medications:     Current Discharge Medication List                  Admission HPI :   Donnie Metzger is a 53 y.o. male has a past history of depression, ADHD,  DVT, Hepatitis C, HTN, cellulitis with MRSA infection of left leg, TBI, HLD, anxiety, and PTSD. He also has a history of IVDA with heroin and cocaine, also has h/o alcohol use, approximately 6-12 pack per night. He was admitted for bacterial spinal epidural abscess and bacteremia at [facility] on 8/2/21 and was receiving IV cefepime and Vancomycin. On 08/09, patient decided to leave A apparently as he was not being able to pay his bills online and wanted to go to the bank. He came back to emergency department on 08/10 apparently after finishing his tasks. He apparently also admitted snorting heroin before coming to emergency department and was readmitted.  On 8/12 he got PICC placed. He apparently left AMA for the second time 8/16/21. During his hospitalization, there was concern by hospital staff that he was using his PICC line to use IV drugs that were surreptitiously brought into the hospital.   He presents in the ED today with intermittent fevers, chills, leg weakness and malaise to continue his treatment. Apparently, he was supposed to receive IV antibiotics for 6 to 8 weeks, but left the hospital AMA because he did not like how he was being treated. Denies bowel or bladder issues. Hospital Course :   Mr. Singh Camacho was admitted to monitored floor. He was seen and followed by ID. He was started on IV antibiotics. He was initially started on cefazolin and later changed to vancomycin due to leucopenia. Duration of antibiotics recommended until Sept 24.  working on safe discharge for patient however patient did not want to stay in hospital, left AMA. Labs: Results:       Chemistry Recent Labs     08/25/21  0105 08/24/21  0640 08/23/21  0628   * 101* 87    137 137   K 4.1 4.1 4.5    102 102   CO2 30 31 30   BUN 9 8 9   CREA 0.72 0.68 0.63   CA 8.3* 8.3* 8.5   AGAP 6 4 5   BUCR 13 12 14   AP 78 84 83   TP 7.0 7.7 7.4   ALB 2.2* 2.3* 2.1*   GLOB 4.8* 5.4* 5.3*   AGRAT 0.5* 0.4* 0.4*      CBC w/Diff Recent Labs     08/25/21  0105 08/23/21  0627   WBC 3.1* 3.6*   RBC 3.79* 3.81*   HGB 8.8* 8.9*   HCT 29.8* 29.7*    262   GRANS 47 41   LYMPH 45 49   EOS 2 3      Cardiac Enzymes No results for input(s): CPK, CKND1, DICKSON in the last 72 hours. No lab exists for component: CKRMB, TROIP   Coagulation No results for input(s): PTP, INR, APTT, INREXT in the last 72 hours. Lipid Panel No results found for: CHOL, CHOLPOCT, CHOLX, CHLST, CHOLV, 269374, HDL, HDLP, LDL, LDLC, DLDLP, 314320, VLDLC, VLDL, TGLX, TRIGL, TRIGP, TGLPOCT, CHHD, CHHDX   BNP No results for input(s): BNPP in the last 72 hours.    Liver Enzymes Recent Labs     08/25/21  0105 TP 7.0   ALB 2.2*   AP 78      Thyroid Studies No results found for: T4, T3U, TSH, TSHEXT         Significant Diagnostic Studies: MRI [facility] SPINE W WO CONT    Result Date: 8/19/2021  MRI thoracic spine without and with contrast 8/19/2021. HISTORY: Left lower extremity infection. Bacteremia. . TECHNIQUE: Multiplanar, multisequential images of the thoracic spine were obtained before and after the administration of 19cc of IV MultiHance. COMPARISON: Thoracic radiographs from 7/22/2019. FINDINGS:The examination is degraded by motion. Postsurgical changes are again noted status post posterior wiliam and bilateral pedicle screw fixation from T10-T12. A transversely oriented cross-link is again noted at the T11-T12 level and there is posterior decompression from T10-T12. There are associated metallic susceptibility artifacts which obscure visualization of the surrounding structures. On the  sequence, degenerative and postsurgical changes are also present in the cervical spine. Disc osteophyte complex at C3-4 contributes to moderate to severe central canal stenosis with associated impingement upon the cervical cord which is incompletely evaluated on this examination of the thoracic spine. There is also the suggestion of focal myelomalacia within the cervical cord at the C5-6 level, presumably related to the sequela of previous impingement given the postsurgical changes in this region. Multilevel degenerative disc disease is present in the thoracic spine. No compression fractures are identified. There is no evidence of spondylolisthesis. There is fluid within the right T2-3 facet joint. There is associated abnormal signal involving the right T2 and T3 pedicle and laminar regions. There is also associated abnormal enhancement within the adjacent bone and surrounding the facet joint. Posterior paraspinal inflammatory changes and abnormal enhancement extend from T1 to T5.   Abnormal signal and enhancement are also noted along the region of the right T4-5 facet joint. There is associated abnormal dorsal epidural signal extending from T1-T2 through T6-7. There is associated abnormal enhancement extending along the dorsal aspect of the right side of the spinal canal but without evidence of epidural abscess on the present evaluation. At T1 to, the spinal canal and foramina are patent. At T2-3, the spinal canal is patent. There is epidural enhancement extending along the dorsal and right sides of the spinal canal, as well as within the right neural foramen. At T3-4, the spinal canal and foramina also appear to be patent. Epidural enhancement extends along the dorsal and right sides of the spinal canal, as well as within the right neural foramen. At T4-5, the canal and foramina are patent. Dorsal and right lateral epidural enhancement are present with associated extension into the right neural foramen. At T5-6, there is disc bulge and osteophytic spurring without significant central canal stenosis. Foraminal narrowing is minimal.  Dorsal and right lateral epidural enhancement are present with abnormal enhancement extending along the right neural foramen. At T6-7, there is disc osteophyte complex without significant central canal stenosis. Minimal epidural enhancement is present. The foramina are patent. At T7-8, there is a small, broad-based right paracentral disc osteophyte complex which partially effaces the ventrolateral thecal sac. Central canal stenosis is mild. Mild foraminal narrowing is present. At T8-9, there is a small right paracentral disc extrusion which extends superiorly along the inferior 3rd of the T8 vertebral body. Hypertrophic facet changes contribute to mild to moderate central canal stenosis at this level. The thoracic cord is displaced to the left with mild cord deformation. The foramina are patent.  At T9-10, there is disc osteophyte complex with a small to moderate-sized right paracentral disc extrusion which extends superiorly along the inferior 3rd of the T9 vertebral body. This also partially effaces the right ventral lateral thecal sac and displaces the cord posteriorly into the left with mild cord deformation. Central canal stenosis is moderate in severity and moderate biforaminal narrowing is suggested. The spinal canal is partially distorted in the lower thoracic region due to the susceptibility artifacts. Posterior decompression is noted and the spinal canal and foramina appear to be patent. Questionable abnormal signal is suggested in the central cord at the T2-3 level on the sagittal T2 and STIR sequences. This may be artifactual but minimal edema or myelomalacia cannot be excluded. No definite additional areas of abnormal cord signal are identified. Small bilateral pleural effusions are present. No abnormal paraspinal fluid collections are identified. 1.Motion limited evaluation. 2.  Abnormal signal and enhancement associated with the right T2-3 and T4-5 facet joints with associated abnormal paraspinal edema and enhancement. The imaging appearance suggests an infectious facetitis. Abnormal epidural enhancement throughout the upper thoracic region appears to be reactive or representative of epidural phlegmon but without evidence of epidural abscess on the present evaluation. 3.  Multilevel degenerative disc and degenerative facet disease with postsurgical changes in the lower lumbar spine. Extruded disks are identified on the right at T8-9 and T9-10 with associated central canal stenosis and cord impingement. Please refer to the body of the report for a level by level description of the degenerative and postoperative changes with any associated areas of canal and foraminal encroachment. MRI LUMB SPINE W WO CONT    Result Date: 8/19/2021  EXAM: MRI lumbar spine without and with contrast 8/19/2021. CLINICAL INDICATION/HISTORY: Bacteremia. Evaluate for discitis/osteomyelitis. TECHNIQUE: Multiplanar, multisequential images of the lumbar spine were obtained before and after the administration of 20cc of IV MultiHance. COMPARISON: None. FINDINGS:Multilevel degenerative disc disease is present with disc desiccation and mild disc space narrowing with marginal osteophytic spurring. No compression fractures are identified. There is no evidence of spondylolisthesis. At T12-L1, there is diffuse disc bulge and osteophytic spurring. There does not appear to be significant central canal stenosis. Mild foraminal narrowing is present. At L1-2, there is diffuse disc bulge with hypertrophic facet changes and ligamentum flavum hypertrophy. The spinal canal is patent with minimal foraminal narrowing. At L2-3, there is diffuse disc bulge with hypertrophic facet changes and ligamentum flavum hypertrophy. The spinal canal is patent. There is mild foraminal narrowing. At L3-4, there is diffuse disc bulge with hypertrophic facet changes and ligamentum flavum hypertrophy. There appears to have been previous left-sided laminotomy at this level. Spinal canal stenosis is minimal.  The right neural foramen is patent. There is mild left foraminal encroachment. At L4-5, there also appears to been previous left-sided laminotomy. There is diffuse disc bulge with hypertrophic facet changes and ligamentum flavum hypertrophy. Asymmetric disc bulge and/or broad-based left intraforaminal protrusion is suggested. Central canal stenosis is mild. There is left lateral recess stenosis which mildly encroaches upon the descending left L5 nerve root. Mild right and mild to moderate left foraminal narrowing are present. At L5-S1, there is disc bulge with hypertrophic facet changes and ligamentum flavum hypertrophy. The spinal canal is patent. The right neural foramen is patent. There is mild left foraminal narrowing.  The conus medullaris appears to end appropriately at the T12-L1 level and is normal in contour without abnormal signal.  The visualized portions of the cauda equina nerve roots are otherwise unremarkable. There is mild degenerative facet enhancement but no significant areas of abnormal enhancement are identified. 1.Multilevel degenerative disc and degenerative facet disease with postsurgical changes at L3-4 and L4-5. Central canal stenosis is mild with left lateral recess stenosis at L4-5, mildly encroaching upon the descending left L5 nerve root. Please refer to the body of the report for a level by level description of the degenerative and postoperative changes with any associated areas of canal and foraminal encroachment. No results found for this or any previous visit. Please note that this dictation was completed with [employer], the computer voice recognition software. Quite often unanticipated grammatical, syntax, homophones, and other interpretive errors are inadvertently transcribed by the computer software. Please disregard these errors. Please excuse any errors that have escaped final proofreading.      CC: Deja Castellanos MD

## 2021-08-25 NOTE — PROGRESS NOTES
Patient requesting to speak with manager. I went to speak with patient and reiterate that he could not have visitors due to concerning behavior of lethargy after visitation this admission. Patient is requesting to leave against medical advice. Explained to patient that it would not be in his best interest to interrupt his ABX treatment again. Patient still stating that he can't stay without visitors. Explained that he could speak with all family and friends via telephone or facetime. Patient stated he didn't care and wanted to leave. Notified Dr. Brooke Patel. I asked the patient to wait to speak with MD first before leaving. He stated what was he going to say. I stated that the physician would give him a better understanding of the result of not completing treatment. Patient stated, \"NO\" and wants to proceed with AMA. Informed Vernon Patiño RN.

## 2022-07-10 ENCOUNTER — APPOINTMENT (OUTPATIENT)
Dept: MRI IMAGING | Age: 52
End: 2022-07-10
Attending: EMERGENCY MEDICINE
Payer: COMMERCIAL

## 2022-07-10 ENCOUNTER — HOSPITAL ENCOUNTER (EMERGENCY)
Age: 52
Discharge: HOME | End: 2022-07-10
Attending: EMERGENCY MEDICINE | Admitting: EMERGENCY MEDICINE
Payer: COMMERCIAL

## 2022-07-10 ENCOUNTER — APPOINTMENT (OUTPATIENT)
Dept: GENERAL RADIOLOGY | Age: 52
End: 2022-07-10
Attending: EMERGENCY MEDICINE
Payer: COMMERCIAL

## 2022-07-10 VITALS
WEIGHT: 194 LBS | SYSTOLIC BLOOD PRESSURE: 148 MMHG | TEMPERATURE: 98.6 F | DIASTOLIC BLOOD PRESSURE: 78 MMHG | RESPIRATION RATE: 19 BRPM | BODY MASS INDEX: 26.28 KG/M2 | HEIGHT: 72 IN | HEART RATE: 80 BPM | OXYGEN SATURATION: 100 %

## 2022-07-10 DIAGNOSIS — M54.6 THORACIC BACK PAIN, UNSPECIFIED BACK PAIN LATERALITY, UNSPECIFIED CHRONICITY: Primary | ICD-10-CM

## 2022-07-10 DIAGNOSIS — G40.909 RECURRENT SEIZURES (HCC): ICD-10-CM

## 2022-07-10 LAB
AMPHET UR QL SCN: NEGATIVE
ANION GAP SERPL CALC-SCNC: 6 MMOL/L (ref 3–18)
APPEARANCE UR: CLEAR
BACTERIA URNS QL MICRO: NEGATIVE /HPF
BARBITURATES UR QL SCN: NEGATIVE
BASOPHILS # BLD: 0 K/UL (ref 0–0.1)
BASOPHILS NFR BLD: 0 % (ref 0–2)
BENZODIAZ UR QL: NEGATIVE
BILIRUB UR QL: ABNORMAL
BUN SERPL-MCNC: 14 MG/DL (ref 7–18)
BUN/CREAT SERPL: 18 (ref 12–20)
CALCIUM SERPL-MCNC: 8.7 MG/DL (ref 8.5–10.1)
CANNABINOIDS UR QL SCN: NEGATIVE
CHLORIDE SERPL-SCNC: 100 MMOL/L (ref 100–111)
CK SERPL-CCNC: 206 U/L (ref 39–308)
CO2 SERPL-SCNC: 25 MMOL/L (ref 21–32)
COCAINE UR QL SCN: POSITIVE
COLOR UR: ABNORMAL
CREAT SERPL-MCNC: 0.8 MG/DL (ref 0.6–1.3)
DIFFERENTIAL METHOD BLD: ABNORMAL
EOSINOPHIL # BLD: 0 K/UL (ref 0–0.4)
EOSINOPHIL NFR BLD: 0 % (ref 0–5)
EPITH CASTS URNS QL MICRO: ABNORMAL /LPF (ref 0–5)
ERYTHROCYTE [DISTWIDTH] IN BLOOD BY AUTOMATED COUNT: 16.6 % (ref 11.6–14.5)
ETHANOL SERPL-MCNC: 3 MG/DL (ref 0–3)
GLUCOSE SERPL-MCNC: 110 MG/DL (ref 74–99)
GLUCOSE UR STRIP.AUTO-MCNC: NEGATIVE MG/DL
HCT VFR BLD AUTO: 35.9 % (ref 36–48)
HDSCOM,HDSCOM: ABNORMAL
HGB BLD-MCNC: 10.6 G/DL (ref 13–16)
HGB UR QL STRIP: ABNORMAL
IMM GRANULOCYTES # BLD AUTO: 0.1 K/UL (ref 0–0.04)
IMM GRANULOCYTES NFR BLD AUTO: 0 % (ref 0–0.5)
KETONES UR QL STRIP.AUTO: 15 MG/DL
LEUKOCYTE ESTERASE UR QL STRIP.AUTO: ABNORMAL
LYMPHOCYTES # BLD: 1.1 K/UL (ref 0.9–3.6)
LYMPHOCYTES NFR BLD: 8 % (ref 21–52)
MAGNESIUM SERPL-MCNC: 1.9 MG/DL (ref 1.6–2.6)
MCH RBC QN AUTO: 22.2 PG (ref 24–34)
MCHC RBC AUTO-ENTMCNC: 29.5 G/DL (ref 31–37)
MCV RBC AUTO: 75.1 FL (ref 78–100)
METHADONE UR QL: NEGATIVE
MONOCYTES # BLD: 0.7 K/UL (ref 0.05–1.2)
MONOCYTES NFR BLD: 5 % (ref 3–10)
MUCOUS THREADS URNS QL MICRO: ABNORMAL /LPF
NEUTS SEG # BLD: 12.2 K/UL (ref 1.8–8)
NEUTS SEG NFR BLD: 86 % (ref 40–73)
NITRITE UR QL STRIP.AUTO: NEGATIVE
NRBC # BLD: 0 K/UL (ref 0–0.01)
NRBC BLD-RTO: 0 PER 100 WBC
OPIATES UR QL: POSITIVE
PCP UR QL: NEGATIVE
PH UR STRIP: 5.5 [PH] (ref 5–8)
PLATELET # BLD AUTO: 368 K/UL (ref 135–420)
PMV BLD AUTO: 10.2 FL (ref 9.2–11.8)
POTASSIUM SERPL-SCNC: 4.5 MMOL/L (ref 3.5–5.5)
PROT UR STRIP-MCNC: 30 MG/DL
RBC # BLD AUTO: 4.78 M/UL (ref 4.35–5.65)
RBC #/AREA URNS HPF: >100 /HPF (ref 0–5)
SODIUM SERPL-SCNC: 131 MMOL/L (ref 136–145)
SP GR UR REFRACTOMETRY: >1.03 (ref 1–1.03)
UROBILINOGEN UR QL STRIP.AUTO: 2 EU/DL (ref 0.2–1)
WBC # BLD AUTO: 14.2 K/UL (ref 4.6–13.2)
WBC URNS QL MICRO: ABNORMAL /HPF (ref 0–5)

## 2022-07-10 PROCEDURE — 72146 MRI CHEST SPINE W/O DYE: CPT

## 2022-07-10 PROCEDURE — 83735 ASSAY OF MAGNESIUM: CPT

## 2022-07-10 PROCEDURE — 74011000250 HC RX REV CODE- 250: Performed by: EMERGENCY MEDICINE

## 2022-07-10 PROCEDURE — 96375 TX/PRO/DX INJ NEW DRUG ADDON: CPT

## 2022-07-10 PROCEDURE — 74011250637 HC RX REV CODE- 250/637: Performed by: EMERGENCY MEDICINE

## 2022-07-10 PROCEDURE — 72148 MRI LUMBAR SPINE W/O DYE: CPT

## 2022-07-10 PROCEDURE — 99284 EMERGENCY DEPT VISIT MOD MDM: CPT

## 2022-07-10 PROCEDURE — 74011250636 HC RX REV CODE- 250/636: Performed by: EMERGENCY MEDICINE

## 2022-07-10 PROCEDURE — 96372 THER/PROPH/DIAG INJ SC/IM: CPT

## 2022-07-10 PROCEDURE — 82077 ASSAY SPEC XCP UR&BREATH IA: CPT

## 2022-07-10 PROCEDURE — 96374 THER/PROPH/DIAG INJ IV PUSH: CPT

## 2022-07-10 PROCEDURE — 85025 COMPLETE CBC W/AUTO DIFF WBC: CPT

## 2022-07-10 PROCEDURE — 82550 ASSAY OF CK (CPK): CPT

## 2022-07-10 PROCEDURE — 71045 X-RAY EXAM CHEST 1 VIEW: CPT

## 2022-07-10 PROCEDURE — 80307 DRUG TEST PRSMV CHEM ANLYZR: CPT

## 2022-07-10 PROCEDURE — 80048 BASIC METABOLIC PNL TOTAL CA: CPT

## 2022-07-10 PROCEDURE — 81001 URINALYSIS AUTO W/SCOPE: CPT

## 2022-07-10 RX ORDER — DIPHENHYDRAMINE HYDROCHLORIDE 50 MG/ML
50 INJECTION, SOLUTION INTRAMUSCULAR; INTRAVENOUS
Status: COMPLETED | OUTPATIENT
Start: 2022-07-10 | End: 2022-07-10

## 2022-07-10 RX ORDER — HYDROMORPHONE HYDROCHLORIDE 1 MG/ML
1 INJECTION, SOLUTION INTRAMUSCULAR; INTRAVENOUS; SUBCUTANEOUS ONCE
Status: DISCONTINUED | OUTPATIENT
Start: 2022-07-10 | End: 2022-07-11 | Stop reason: HOSPADM

## 2022-07-10 RX ORDER — ONDANSETRON 2 MG/ML
4 INJECTION INTRAMUSCULAR; INTRAVENOUS
Status: COMPLETED | OUTPATIENT
Start: 2022-07-10 | End: 2022-07-10

## 2022-07-10 RX ORDER — GABAPENTIN 300 MG/1
600 CAPSULE ORAL 3 TIMES DAILY
Qty: 60 CAPSULE | Refills: 0 | Status: SHIPPED | OUTPATIENT
Start: 2022-07-10

## 2022-07-10 RX ORDER — KETAMINE HYDROCHLORIDE 50 MG/ML
0.3 INJECTION, SOLUTION INTRAMUSCULAR; INTRAVENOUS ONCE
Status: COMPLETED | OUTPATIENT
Start: 2022-07-10 | End: 2022-07-10

## 2022-07-10 RX ORDER — CLONAZEPAM 0.5 MG/1
2 TABLET ORAL
Status: COMPLETED | OUTPATIENT
Start: 2022-07-10 | End: 2022-07-10

## 2022-07-10 RX ORDER — ACETAMINOPHEN 325 MG/1
650 TABLET ORAL ONCE
Status: DISCONTINUED | OUTPATIENT
Start: 2022-07-10 | End: 2022-07-11 | Stop reason: HOSPADM

## 2022-07-10 RX ORDER — GUANFACINE HYDROCHLORIDE 2 MG/1
3 TABLET ORAL DAILY
Qty: 7 TABLET | Refills: 0 | Status: SHIPPED | OUTPATIENT
Start: 2022-07-10

## 2022-07-10 RX ORDER — OXYCODONE AND ACETAMINOPHEN 5; 325 MG/1; MG/1
1 TABLET ORAL
Status: COMPLETED | OUTPATIENT
Start: 2022-07-10 | End: 2022-07-10

## 2022-07-10 RX ORDER — ONDANSETRON 4 MG/1
4 TABLET, ORALLY DISINTEGRATING ORAL
Status: COMPLETED | OUTPATIENT
Start: 2022-07-10 | End: 2022-07-10

## 2022-07-10 RX ADMIN — ONDANSETRON 4 MG: 4 TABLET, ORALLY DISINTEGRATING ORAL at 20:00

## 2022-07-10 RX ADMIN — ONDANSETRON HYDROCHLORIDE 4 MG: 2 INJECTION INTRAMUSCULAR; INTRAVENOUS at 16:25

## 2022-07-10 RX ADMIN — CLONAZEPAM 2 MG: 0.5 TABLET ORAL at 23:08

## 2022-07-10 RX ADMIN — OXYCODONE HYDROCHLORIDE AND ACETAMINOPHEN 1 TABLET: 5; 325 TABLET ORAL at 23:40

## 2022-07-10 RX ADMIN — METHYLPREDNISOLONE SODIUM SUCCINATE 125 MG: 125 INJECTION, POWDER, FOR SOLUTION INTRAMUSCULAR; INTRAVENOUS at 15:00

## 2022-07-10 RX ADMIN — KETAMINE HYDROCHLORIDE 26.5 MG: 50 INJECTION, SOLUTION INTRAMUSCULAR; INTRAVENOUS at 16:30

## 2022-07-10 RX ADMIN — DIPHENHYDRAMINE HYDROCHLORIDE 50 MG: 50 INJECTION, SOLUTION INTRAMUSCULAR; INTRAVENOUS at 19:44

## 2022-07-10 NOTE — ED PROVIDER NOTES
EMERGENCY DEPARTMENT HISTORY AND PHYSICAL EXAM    Date: 7/10/2022  Patient Name: Awa Ohara    History of Presenting Illness     Chief Complaint   Patient presents with    Seizure    Back Pain         History Provided By: Patient    Additional History (Context):   1:31 PM  Awa Ohara is a 49 y.o. male with PMHX of chronic back pain, IV drug abuse including heroin and cocaine, epidural abscess of the thoracic spine abscesses of the forearm, seizures, chronic dental disease who presents to the emergency department complaining of back pain. Patient states he had a seizure today after running out of his medications. He states he fell to the floor woke with severe exacerbation of his back pain. He denies any fevers chills cough difficulty breathing. He denies any numbness tingling to his lower extremities. He denies any arm or leg paresthesias weakness. He denies any nausea vomiting. He does acknowledge drinking alcohol and continuing to use drugs. He also states that he is out of his regular prescribed medications. Social History  He knowledges smoking cigarettes as well as marijuana. He acknowledges IV drug use. He states he states he is snorting heroin as opposed to shooting anything. He did knowledges alcohol use. He is disabled to discharge from the eye pain. 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 He also suffered an explosive injury with subsequent PTSD. Family History  No significant family history. PCP: Gee Mejia MD    Current Facility-Administered Medications   Medication Dose Route Frequency Provider Last Rate Last Admin    gadoteridoL (PROHANCE) 279.3 mg/mL contrast solution 15 mL  15 mL IntraVENous RAD ONCE Shelbi Norton MD         Current Outpatient Medications   Medication Sig Dispense Refill    guanFACINE IR (Tenex) 2 mg IR tablet Take 1.5 Tablets by mouth daily.  Indications: ptsd 7 Tablet 0    gabapentin (NEURONTIN) 300 mg capsule Take 2 Capsules by mouth three (3) times daily. Max Daily Amount: 1,800 mg. Indications: neuropathic pain 60 Capsule 0    methadone (DOLOPHINE) 10 mg tablet Take 10 mg by mouth three (3) times daily.  piperacillin sodium/tazobactam (ZOSYN IV) by IntraVENous route.  vancomycin 1,000 mg 1 g, ADDaptor 1 Each IVPB 1 g by IntraVENous route every twelve (12) hours every twelve (12) hours.  HYDROmorphone (DILAUDID) 4 mg tablet Take 4 mg by mouth every four (4) hours as needed for Pain.  oxyCODONE IR (ROXICODONE) 10 mg tab immediate release tablet Take 1-1.5 Tabs by mouth every four (4) hours as needed. Max Daily Amount: 90 mg. 90 Tab 0    tiZANidine (ZANAFLEX) 4 mg tablet Take 1 Tab by mouth three (3) times daily as needed. Indications: MUSCLE SPASM 60 Tab 0    clonazePAM (KLONOPIN) 2 mg tablet Take 2 mg by mouth nightly. Indications: sleep      clonazePAM (KLONOPIN) 1 mg tablet Take 1 mg by mouth three (3) times daily as needed. Indications: anxiety      tadalafil (CIALIS) 5 mg tablet Take 5 mg by mouth daily. Indications: Erectile Dysfunction      levothyroxine (SYNTHROID) 75 mcg tablet Take 75 mcg by mouth Daily (before breakfast). Indications: hypothyroidism      pregabalin (LYRICA) 300 mg capsule Take 300 mg by mouth two (2) times a day. Indications: GENERALIZED ANXIETY DISORDER, neuropathy (Patient not taking: Reported on 8/18/2021)      dextroamphetamine-amphetamine (ADDERALL) 30 mg tablet Take 30 mg by mouth two (2) times a day. Indications: ATTENTION-DEFICIT HYPERACTIVITY DISORDER      esomeprazole (NEXIUM) 40 mg capsule Take 40 mg by mouth daily. Indications: GASTROESOPHAGEAL REFLUX      tamsulosin (FLOMAX) 0.4 mg capsule Take 0.4 mg by mouth daily. Indications: neurogenic bladder      vortioxetine (BRINTELLIX) 20 mg tablet Take 20 mg by mouth daily.  Indications: major depressive disorder, ptsd         Past History     Past Medical History:  Past Medical History:   Diagnosis Date    Adverse effect of anesthesia     hx of esophageal spasms    Arthritis     left ankle and knee, right shoulder    BPH (benign prostatic hypertrophy)     Cancer (HCC)     ? soft tissue sarcoma    Chronic back pain     combat related / surgical    Chronic pain     DJD entire spine, neck, whole body    Dizziness due to old head injury     Esophageal spasm     GERD (gastroesophageal reflux disease)     Hypercholesterolemia     Migraine     Neurogenic bladder     Neuropathy     PERIPHERAL    Other ill-defined conditions(799.89)     PTSD    Psychiatric disorder     PTSD, adhd, depression, anxiety, brain injury, psychosis, frontal lobe disorder    PTSD (post-traumatic stress disorder)     Seizures (HCC)     cataplexy    Thyroid disease     hypo    Traumatic brain injury ([facility] [address]     combat related       Past Surgical History:  Past Surgical History:   Procedure Laterality Date    HX CERVICAL FUSION      HX GASTRIC BYPASS  2014    gastric sleeve    HX KNEE ARTHROSCOPY      left    HX ORTHOPAEDIC      left ankle rebuilt    HX ORTHOPAEDIC  2016    cervical neck    HX ORTHOPAEDIC      Lumbar Laminectomy 5/22/2017    HX THORACIC LAMINECTOMY  2012    with fusion    LA LAP, GALDINO RESTRICT PROC, LONGITUDINAL GASTRECTOMY  10/2013    sleeve resection       Family History:  History reviewed. No pertinent family history. Social History:  Social History     Tobacco Use    Smoking status: Current Every Day Smoker     Packs/day: 0.50     Years: 10.00     Pack years: 5.00     Start date: 8/21/2000    Smokeless tobacco: Never Used   Substance Use Topics    Alcohol use: Yes     Alcohol/week: 82.0 standard drinks     Types: 42 Shots of liquor, 40 Cans of beer per week     Comment:  beer and liquor both daily     Drug use: Yes     Types: Heroin     Comment: used heroin a few days ago       Allergies:   Allergies   Allergen Reactions    Prozac [Fluoxetine] Unknown (comments)     Hives or jittery-pt can't remember    Augmentin [Amoxicillin-Pot Clavulanate] Rash    Geodon [Ziprasidone Hcl] Other (comments)     diskensia reported by pt      Mastisol Adhesive [Gum Olpdtg-Umzzxg-Rczu-Alcohol] Rash    Morphine Itching     Morphine -IR, not ER    Quetiapine Palpitations    Trazodone Hives    Vicodin [Hydrocodone-Acetaminophen] Hives         Review of Systems   Review of Systems   Musculoskeletal: Positive for back pain. Neurological: Positive for seizures. All other systems reviewed and are negative. Physical Exam     Vitals:    07/10/22 1537 07/10/22 1607 07/10/22 1750 07/10/22 1900   BP: (!) 164/95 (!) 172/68 (!) 161/63    Pulse: 79 80     Resp: 12 19     Temp:       SpO2: (!) 87% 100%  100%   Weight:       Height:         Physical Exam  Vitals and nursing note reviewed. Constitutional:       General: He is not in acute distress. Appearance: He is well-developed. He is not diaphoretic. HENT:      Head: Normocephalic and atraumatic. Eyes:      General: No scleral icterus. Extraocular Movements:      Right eye: Normal extraocular motion. Left eye: Normal extraocular motion. Conjunctiva/sclera: Conjunctivae normal.      Pupils: Pupils are equal, round, and reactive to light. Neck:      Trachea: No tracheal deviation. Cardiovascular:      Rate and Rhythm: Normal rate and regular rhythm. Heart sounds: Normal heart sounds. Pulmonary:      Effort: Pulmonary effort is normal. No respiratory distress. Breath sounds: Normal breath sounds. No stridor. Abdominal:      General: Bowel sounds are normal. There is no distension. Palpations: Abdomen is soft. Tenderness: There is no abdominal tenderness. There is no rebound. Musculoskeletal:         General: No tenderness. Normal range of motion. Cervical back: Normal range of motion and neck supple. Comments: Grossly unremarkable without abnormalities   Skin:     General: Skin is warm and dry.       Capillary Refill: Capillary refill takes less than 2 seconds. Findings: No erythema or rash. Neurological:      Mental Status: He is alert and oriented to person, place, and time. Cranial Nerves: No cranial nerve deficit. Motor: No weakness. Psychiatric:         Attention and Perception: He is attentive. Mood and Affect: Mood is anxious. Behavior: Behavior is cooperative. Thought Content: Thought content does not include homicidal or suicidal ideation. Diagnostic Study Results     Labs -  Recent Results (from the past 24 hour(s))   CBC WITH AUTOMATED DIFF    Collection Time: 07/10/22 12:20 PM   Result Value Ref Range    WBC 14.2 (H) 4.6 - 13.2 K/uL    RBC 4.78 4.35 - 5.65 M/uL    HGB 10.6 (L) 13.0 - 16.0 g/dL    HCT 35.9 (L) 36.0 - 48.0 %    MCV 75.1 (L) 78.0 - 100.0 FL    MCH 22.2 (L) 24.0 - 34.0 PG    MCHC 29.5 (L) 31.0 - 37.0 g/dL    RDW 16.6 (H) 11.6 - 14.5 %    PLATELET 011 364 - 180 K/uL    MPV 10.2 9.2 - 11.8 FL    NRBC 0.0 0  WBC    ABSOLUTE NRBC 0.00 0.00 - 0.01 K/uL    NEUTROPHILS 86 (H) 40 - 73 %    LYMPHOCYTES 8 (L) 21 - 52 %    MONOCYTES 5 3 - 10 %    EOSINOPHILS 0 0 - 5 %    BASOPHILS 0 0 - 2 %    IMMATURE GRANULOCYTES 0 0.0 - 0.5 %    ABS. NEUTROPHILS 12.2 (H) 1.8 - 8.0 K/UL    ABS. LYMPHOCYTES 1.1 0.9 - 3.6 K/UL    ABS. MONOCYTES 0.7 0.05 - 1.2 K/UL    ABS. EOSINOPHILS 0.0 0.0 - 0.4 K/UL    ABS. BASOPHILS 0.0 0.0 - 0.1 K/UL    ABS. IMM.  GRANS. 0.1 (H) 0.00 - 0.04 K/UL    DF AUTOMATED     METABOLIC PANEL, BASIC    Collection Time: 07/10/22 12:20 PM   Result Value Ref Range    Sodium 131 (L) 136 - 145 mmol/L    Potassium 4.5 3.5 - 5.5 mmol/L    Chloride 100 100 - 111 mmol/L    CO2 25 21 - 32 mmol/L    Anion gap 6 3.0 - 18 mmol/L    Glucose 110 (H) 74 - 99 mg/dL    BUN 14 7.0 - 18 MG/DL    Creatinine 0.80 0.6 - 1.3 MG/DL    BUN/Creatinine ratio 18 12 - 20      GFR est AA >60 >60 ml/min/1.73m2    GFR est non-AA >60 >60 ml/min/1.73m2    Calcium 8.7 8.5 - 10.1 MG/DL   CK Collection Time: 07/10/22 12:20 PM   Result Value Ref Range     39 - 308 U/L   MAGNESIUM    Collection Time: 07/10/22 12:20 PM   Result Value Ref Range    Magnesium 1.9 1.6 - 2.6 mg/dL   ETHYL ALCOHOL    Collection Time: 07/10/22 12:20 PM   Result Value Ref Range    ALCOHOL(ETHYL),SERUM 3 0 - 3 MG/DL   URINALYSIS W/ RFLX MICROSCOPIC    Collection Time: 07/10/22  3:19 PM   Result Value Ref Range    Color DARK YELLOW      Appearance CLEAR      Specific gravity >1.030 (H) 1.005 - 1.030    pH (UA) 5.5 5.0 - 8.0      Protein 30 (A) NEG mg/dL    Glucose Negative NEG mg/dL    Ketone 15 (A) NEG mg/dL    Bilirubin SMALL (A) NEG      Blood LARGE (A) NEG      Urobilinogen 2.0 (H) 0.2 - 1.0 EU/dL    Nitrites Negative NEG      Leukocyte Esterase TRACE (A) NEG     DRUG SCREEN, URINE    Collection Time: 07/10/22  3:19 PM   Result Value Ref Range    BENZODIAZEPINES Negative NEG      BARBITURATES Negative NEG      THC (TH-CANNABINOL) Negative NEG      OPIATES Positive (A) NEG      PCP(PHENCYCLIDINE) Negative NEG      COCAINE Positive (A) NEG      AMPHETAMINES Negative NEG      METHADONE Negative NEG      HDSCOM (NOTE)    URINE MICROSCOPIC ONLY    Collection Time: 07/10/22  3:19 PM   Result Value Ref Range    WBC 4 to 10 0 - 5 /hpf    RBC >100 (H) 0 - 5 /hpf    Epithelial cells FEW 0 - 5 /lpf    Bacteria Negative NEG /hpf    Mucus FEW (A) NEG /lpf        Radiologic Studies -   Preliminary findings  Chest x-ray  Cardiac silhouette is clear lungs are clear without evidence of pneumonia or infiltrate.   Final radiology report pending  Sparkle Andrade MD    MRI LUMB SPINE WO CONT    (Results Pending)   MRI [address] SPINE WO CONT    (Results Pending)   XR CHEST PORT    (Results Pending)     CT Results  (Last 48 hours)    None        CXR Results  (Last 48 hours)    None          Medications given in the ED-  Medications   gadoteridoL (PROHANCE) 279.3 mg/mL contrast solution 15 mL (has no administration in time range)   ketamine (KETALAR) 50 mg/mL injection 26.5 mg (26.5 mg IntraVENous Given by Provider 7/10/22 1630)   methylPREDNISolone (PF) (Solu-MEDROL) injection 125 mg (125 mg IntraVENous Given 7/10/22 1500)   ondansetron (ZOFRAN) injection 4 mg (4 mg IntraVENous Given 7/10/22 1625)         Medical Decision Making   I am the first provider for this patient. I reviewed the vital signs, available nursing notes, past medical history, past surgical history, family history and social history. Vital Signs-Reviewed the patient's vital signs. Pulse Oximetry Analysis -100% on room air    Cardiac Monitor:  Rate: 83 bpm  Rhythm: Sinus rhythm        Records Reviewed: NURSING NOTES AND PREVIOUS MEDICAL RECORDS    Provider Notes (Medical Decision Making):   Patient with back pain after breakthrough seizure. He states he takes gabapentin for his seizures no other anticonvulsants. States he is out of all of his medications including his oral methadone. Knowledges considering the continued drug use although he states he is snorting I cannot find anything that looks like a recent injection however his body is covered with multiple regions which look to resemble track marks or picking. In reviewing his records he has had periods where if he has had epidural abscess in spite of not acknowledging fevers chills. His white count is elevated. He was sent for MRI to evaluate for his spine and lumbar region as well as thoracic region. Also electrolytes sent for evaluation. Procedures:  Procedures    ED Course:   1:31 PM: Initial assessment performed. The patients presenting problems have been discussed, and they are in agreement with the care plan formulated and outlined with them. I have encouraged them to ask questions as they arise throughout their visit. Diagnosis and Disposition     SIGN OUT:  7:17 PM  Patient's presentation, labs/imaging and plan of care was reviewed with Dr. Golden Moreno as part of sign out.   They will follow-up on the MRI as part of the plan discussed with the patient.     Kenneth Ramirez MD     CLINICAL IMPRESSION:    1. Thoracic back pain, unspecified back pain laterality, unspecified chronicity    2. Recurrent seizures ([facility] [address]        PLAN:  1. D/C Home  2. Current Discharge Medication List      CONTINUE these medications which have CHANGED    Details   guanFACINE IR (Tenex) 2 mg IR tablet Take 1.5 Tablets by mouth daily. Indications: ptsd  Qty: 7 Tablet, Refills: 0  Start date: 7/10/2022      gabapentin (NEURONTIN) 300 mg capsule Take 2 Capsules by mouth three (3) times daily. Max Daily Amount: 1,800 mg. Indications: neuropathic pain  Qty: 60 Capsule, Refills: 0  Start date: 7/10/2022    Associated Diagnoses: Thoracic back pain, unspecified back pain laterality, unspecified chronicity; Recurrent seizures ([facility] [address]           3. Follow-up Information    None       _______________________________    This note was partially transcribed via voice recognition software. Although efforts have been made to catch any discrepancies, it may contain sound alike words, grammatical errors, or nonsensical words.

## 2022-07-10 NOTE — ED TRIAGE NOTES
Pt arrived to ED with c/o an unwitnessed seizure this AM at 0930 and back pain that has been there a couple days. He said he looked at the clock before his seizure happened and when he woke up it was 1000. LOC unknown. [id number]. Pt takes gabapentin for his seizures. Takes dilaudid and methadone for his pain. Says his seizures started after serving in [employer].

## 2022-07-11 ENCOUNTER — HOSPITAL ENCOUNTER (EMERGENCY)
Age: 52
Discharge: HOME | End: 2022-07-11
Attending: EMERGENCY MEDICINE
Payer: MEDICARE

## 2022-07-11 ENCOUNTER — HOSPITAL ENCOUNTER (EMERGENCY)
Dept: CT IMAGING | Age: 52
Discharge: HOME | End: 2022-07-11
Attending: EMERGENCY MEDICINE
Payer: MEDICARE

## 2022-07-11 VITALS
WEIGHT: 200 LBS | TEMPERATURE: 98.5 F | RESPIRATION RATE: 16 BRPM | HEART RATE: 73 BPM | BODY MASS INDEX: 27.09 KG/M2 | SYSTOLIC BLOOD PRESSURE: 168 MMHG | HEIGHT: 72 IN | DIASTOLIC BLOOD PRESSURE: 75 MMHG | OXYGEN SATURATION: 100 %

## 2022-07-11 DIAGNOSIS — S09.90XA INJURY OF HEAD, INITIAL ENCOUNTER: ICD-10-CM
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
## 2022-07-11 PROCEDURE — 74011250637 HC RX REV CODE- 250/637: Performed by: EMERGENCY MEDICINE

## 2022-07-11 PROCEDURE — 70450 CT HEAD/BRAIN W/O DYE: CPT

## 2022-07-11 PROCEDURE — 99284 EMERGENCY DEPT VISIT MOD MDM: CPT

## 2022-07-11 PROCEDURE — 72125 CT NECK SPINE W/O DYE: CPT

## 2022-07-11 RX ORDER — ACETAMINOPHEN 500 MG
1000 TABLET ORAL
Status: COMPLETED | OUTPATIENT
Start: 2022-07-11 | End: 2022-07-11

## 2022-07-11 RX ADMIN — ACETAMINOPHEN 1000 MG: 500 TABLET ORAL at 03:12

## 2022-07-11 NOTE — ED NOTES
Spoke to on-call Dr. PIERRE [facility] regarding this patient. His MRI from January and August has been exactly the same. Patient is notoriously noncompliant. They said that they would see him outpatient in the clinic I will give him the number to follow-up.

## 2022-07-11 NOTE — ED TRIAGE NOTES
C/O unwitnessed fall from standing in waiting room. Per security staff, heard \"thud\" against door. Staff notified and observed Pt sitting in chair in lobby. Neuro status A&Ox4, c/o new back/arm/leg/hand pain. C-Collar applied and Pt brought to RM 4. MD made aware. No obvious s/sx trauma noted or observed during assessment.

## 2022-07-11 NOTE — ED TRIAGE NOTES
Pt states \"I fell on security door then to the floor. Denies loc.  Pain to head, neck, back, arms, and legs 10/10.  c collar applied

## 2022-07-11 NOTE — PROGRESS NOTES
Patient Name: Juhi Gonzalez   Age: 49 y.o. Sex:  male  YOB: 1970   Medical Record Number: [id number]      Antimicrobial  Vancomycin   Indication SSTI   Dose / Interval           Current Antimicrobial Therapy (168h ago, onward)       Ordered     Start Stop    07/10/22 2303  piperacillin-tazobactam (ZOSYN) 3.375 g in 0.9% sodium chloride (MBP/ADV) 100 mL MBP  3.375 g,   IntraVENous,   NOW        References:    Lexicomp    07/10/22 2304 22 1103                     Last Level (if applicable) No results found for: DOSE, TMG, DTG  Vancomycin   Lab Results   Component Value Date/Time    Vancomycin, random 20.2 2021 06:40 AM      Gentamicin   No results found for: GENP, GENT, GENR]  Tobramycin   No results found for: TOBP, TOBT, TOBR   Amikacin   No results found for: Kely João, AMIKT, ZAINABIKT, NATALYR     Cultures (7 most recent)   Culture result:   Date Value Ref Range Status   2021 NO GROWTH 6 DAYS   Final   2021 NO GROWTH 6 DAYS   Final   2017 NO GROWTH 6 DAYS   Final   2017    Final    MRSA target DNA is not detected (presumptive not colonized with MRSA)   2016    Final    MRSA target DNA is not detected (presumptive not colonized with MRSA)      Renal Overview (72 hr)         Recent Labs     07/10/22  1220   BUN 14   CREA 0.80       CrCl (Current): Estimated Creatinine Clearance: 121.3 mL/min (based on SCr of 0.8 mg/dL). Lactic Acid    (Sepsis Criteria: >2.0 mmol/L) Lab Results   Component Value Date/Time    Lactic acid 1.0 2021 10:20 PM    Lactic acid 0.7 2017 09:57 PM      Procalcitonin (0.10-0.49 NG/ML) No results found for: PCT   Hepatic Overview (72 hr) No results for input(s): ALT, AP in the last 72 hours.     No lab exists for component: SGOT   Temp (24-hr Max) Temp (24hrs), Av.6 °F (37 °C), Min:98.6 °F (37 °C), Max:98.6 °F (37 °C)       Hematology Recent Labs     07/10/22  1220   WBC 14.2*   HGB 10.6*   HCT 35.9*    ASPEN 86*   ANEU 12.2*        DOT  Continuing home therapy   pt reports taking Vancomycin 1 gm  IV q 12 hrs     Notes   Regimen: 1250 mg IV every 12 hours.   Start time: 23:32 on 07/10/2022  Exposure target: AUC24 (range)400-600 mg/L.hr   AUC24,ss: 492 mg/L.hr  Probability of AUC24 > 400: 72 %  Ctrough,ss: 14.9 mg/L  Probability of Ctrough,ss > 20: 27 %  Probability of nephrotoxicity (Lodise [place] 2009): 10 %  Waiting for random level to initiate therapy       Philip Winkler, [address] [facility] Pharmacist  [phone]
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
## 2022-07-11 NOTE — DISCHARGE INSTRUCTIONS
Come back if you get worse. Follow-up without fail. Call [phone] for outpatient follow up with spine doctor with disk for MRI report.

## 2023-04-21 NOTE — PROGRESS NOTES
1900  Received pt from AMALIA Brady RN. Vitals stable Pt resting in bed denying SOB and chest pain. JOSH hose applied to BLE. 20 G IV to right hand infusing LR at 125ml/hr. Spouse at bedside. Call bell and possessions within reach. 1915  Bedside and Verbal shift change report given to [id number] Luciano Sullivan (oncoming nurse) by Nolan Rico RN (offgoing nurse). Report included the following information SBAR, Kardex, MAR and Recent Results. Addended by: ALEC WATOSN on: 4/21/2023 03:52 PM     Modules accepted: Orders

## (undated) DEVICE — SHEET,DRAPE,70X85,STERILE: Brand: MEDLINE

## (undated) DEVICE — ROUND DISSECTORS: Brand: DEROYAL

## (undated) DEVICE — REM POLYHESIVE ADULT PATIENT RETURN ELECTRODE: Brand: VALLEYLAB

## (undated) DEVICE — PREP SKN PREVAIL 40ML APPL --

## (undated) DEVICE — DRAIN KT WND 10FR RND 400ML --

## (undated) DEVICE — DRESSING PETRO W3XL8IN N ADH OIL EMUL GZ CURAD

## (undated) DEVICE — NDL SPNE QNCKE 18GX3.5IN LF --

## (undated) DEVICE — FLOSEAL MATRIX IS INDICATED IN SURGICAL PROCEDURES (OTHER THAN IN OPHTHALMIC) AS AN ADJUNCT TO HEMOSTASIS WHEN CONTROL OF BLEEDING BY LIGATURE OR CONVENTIONAL PROCEDURES IS INEFFECTIVE OR IMPRACTICAL.: Brand: FLOSEAL HEMOSTATIC MATRIX

## (undated) DEVICE — X-RAY SPONGES,12 PLY: Brand: DERMACEA

## (undated) DEVICE — BIPOLAR SEALER 23-314-1 AQM MINI EVS 3.4: Brand: AQUAMANTYS™

## (undated) DEVICE — SUT VCRL + 0 36IN UR6 VIO --

## (undated) DEVICE — PAD,ABDOMINAL,5"X9",STERILE,LF,1/PK: Brand: MEDLINE INDUSTRIES, INC.

## (undated) DEVICE — SUTURE VCRL + SZ 2-0 L18IN ABSRB VLT CT-2 1/2 CIR TAPERCUT VCP726D

## (undated) DEVICE — Device

## (undated) DEVICE — 4-PORT MANIFOLD: Brand: NEPTUNE 2

## (undated) DEVICE — PLUS HANDPIECE WITH SPRAY TIP: Brand: SURGILAV

## (undated) DEVICE — STERILE POLYISOPRENE POWDER-FREE SURGICAL GLOVES: Brand: PROTEXIS

## (undated) DEVICE — SHEET,DRAPE,40X58,STERILE: Brand: MEDLINE

## (undated) DEVICE — SUTURE PDS LL SZ 2-0 L18IN ABSRB VLT CT-1 L36MM 1/2 CIR Z739D

## (undated) DEVICE — 1010 S-DRAPE TOWEL DRAPE 10/BX: Brand: STERI-DRAPE™

## (undated) DEVICE — SUT ETHLN 2-0 18IN FS BLK --

## (undated) DEVICE — SUTURE PDS II SZ 1 L27IN ABSRB VLT CT-1 L36MM 1/2 CIR Z341H

## (undated) DEVICE — SOLUTION IRRIG 3000ML LAC R FLX CONT

## (undated) DEVICE — MEDI-VAC NON-CONDUCTIVE SUCTION TUBING: Brand: CARDINAL HEALTH

## (undated) DEVICE — 3M™ TEGADERM™ TRANSPARENT FILM DRESSING FRAME STYLE, 1626, 4 IN X 4-3/4 IN (10 CM X 12 CM), 50/CT 4CT/CASE: Brand: 3M™ TEGADERM™

## (undated) DEVICE — Z DISCONTINUED USE (MFG CAT 7984-37) SOLUTION IV SODIUM CHL 0.9% 100 ML INJ

## (undated) DEVICE — SUTURE PDS II SZ 2-0 L27IN ABSRB VLT L26MM CT-2 1/2 CIR Z333H

## (undated) DEVICE — SOL IRRIGATION INJ NACL 0.9% 500ML BTL

## (undated) DEVICE — DERMABOND SKIN ADH 0.7ML -- DERMABOND ADVANCED 12/BX

## (undated) DEVICE — KENDALL SCD EXPRESS SLEEVES, KNEE LENGTH, MEDIUM: Brand: KENDALL SCD

## (undated) DEVICE — SINGLE PORT MANIFOLD: Brand: NEPTUNE 2

## (undated) DEVICE — PACK PROCEDURE SURG LAMINECTOMY SPINE CUST

## (undated) DEVICE — SUTURE PROL SZ 3-0 L18IN NONABSORBABLE BLU L19MM PC-5 3/8 8632G

## (undated) DEVICE — WILSON FRAME STYLE POSITIONING KITS - 	FRAME PAD SLEEVES (SET OF 2) , DRAPE PROTECTOR, BAR PROTECTOR 7" COMFORT FOAM HEADREST, LAMINECTOMY ARM CRADLES: Brand: SOULE MEDICAL